# Patient Record
Sex: FEMALE | Race: WHITE | NOT HISPANIC OR LATINO | Employment: OTHER | URBAN - METROPOLITAN AREA
[De-identification: names, ages, dates, MRNs, and addresses within clinical notes are randomized per-mention and may not be internally consistent; named-entity substitution may affect disease eponyms.]

---

## 2017-01-19 ENCOUNTER — HOSPITAL ENCOUNTER (OUTPATIENT)
Facility: MEDICAL CENTER | Age: 67
End: 2017-01-19
Attending: PLASTIC SURGERY | Admitting: PLASTIC SURGERY
Payer: COMMERCIAL

## 2017-01-19 VITALS
OXYGEN SATURATION: 94 % | HEIGHT: 66 IN | RESPIRATION RATE: 16 BRPM | WEIGHT: 149.91 LBS | TEMPERATURE: 97.6 F | SYSTOLIC BLOOD PRESSURE: 124 MMHG | HEART RATE: 73 BPM | BODY MASS INDEX: 24.09 KG/M2 | DIASTOLIC BLOOD PRESSURE: 62 MMHG

## 2017-01-19 PROBLEM — Z85.3 PERSONAL HISTORY OF MALIGNANT NEOPLASM OF BREAST: Status: ACTIVE | Noted: 2017-01-19

## 2017-01-19 PROBLEM — D05.12 INTRADUCTAL CARCINOMA IN SITU OF LEFT BREAST: Status: RESOLVED | Noted: 2017-01-19 | Resolved: 2017-01-19

## 2017-01-19 PROBLEM — N65.0 DEFORMITY OF RECONSTRUCTED BREAST: Status: ACTIVE | Noted: 2017-01-19

## 2017-01-19 PROBLEM — D05.12 INTRADUCTAL CARCINOMA IN SITU OF LEFT BREAST: Status: ACTIVE | Noted: 2017-01-19

## 2017-01-19 LAB
BASOPHILS # BLD AUTO: 1 % (ref 0–1.8)
BASOPHILS # BLD: 0.05 K/UL (ref 0–0.12)
EOSINOPHIL # BLD AUTO: 0.13 K/UL (ref 0–0.51)
EOSINOPHIL NFR BLD: 2.5 % (ref 0–6.9)
ERYTHROCYTE [DISTWIDTH] IN BLOOD BY AUTOMATED COUNT: 46.9 FL (ref 35.9–50)
HCT VFR BLD AUTO: 46 % (ref 37–47)
HGB BLD-MCNC: 14.8 G/DL (ref 12–16)
IMM GRANULOCYTES # BLD AUTO: 0.02 K/UL (ref 0–0.11)
IMM GRANULOCYTES NFR BLD AUTO: 0.4 % (ref 0–0.9)
LYMPHOCYTES # BLD AUTO: 1.43 K/UL (ref 1–4.8)
LYMPHOCYTES NFR BLD: 27.6 % (ref 22–41)
MCH RBC QN AUTO: 29.7 PG (ref 27–33)
MCHC RBC AUTO-ENTMCNC: 32.2 G/DL (ref 33.6–35)
MCV RBC AUTO: 92.4 FL (ref 81.4–97.8)
MONOCYTES # BLD AUTO: 0.33 K/UL (ref 0–0.85)
MONOCYTES NFR BLD AUTO: 6.4 % (ref 0–13.4)
NEUTROPHILS # BLD AUTO: 3.22 K/UL (ref 2–7.15)
NEUTROPHILS NFR BLD: 62.1 % (ref 44–72)
NRBC # BLD AUTO: 0 K/UL
NRBC BLD AUTO-RTO: 0 /100 WBC
PLATELET # BLD AUTO: 157 K/UL (ref 164–446)
PMV BLD AUTO: 10.2 FL (ref 9–12.9)
RBC # BLD AUTO: 4.98 M/UL (ref 4.2–5.4)
WBC # BLD AUTO: 5.2 K/UL (ref 4.8–10.8)

## 2017-01-19 PROCEDURE — 700102 HCHG RX REV CODE 250 W/ 637 OVERRIDE(OP)

## 2017-01-19 PROCEDURE — 160036 HCHG PACU - EA ADDL 30 MINS PHASE I: Performed by: PLASTIC SURGERY

## 2017-01-19 PROCEDURE — 500888 HCHG PACK, MINOR BASIN: Performed by: PLASTIC SURGERY

## 2017-01-19 PROCEDURE — A6402 STERILE GAUZE <= 16 SQ IN: HCPCS | Performed by: PLASTIC SURGERY

## 2017-01-19 PROCEDURE — 110382 HCHG SHELL REV 271: Performed by: PLASTIC SURGERY

## 2017-01-19 PROCEDURE — 160002 HCHG RECOVERY MINUTES (STAT): Performed by: PLASTIC SURGERY

## 2017-01-19 PROCEDURE — A4606 OXYGEN PROBE USED W OXIMETER: HCPCS | Performed by: PLASTIC SURGERY

## 2017-01-19 PROCEDURE — L8600 IMPLANT BREAST SILICONE/EQ: HCPCS | Performed by: PLASTIC SURGERY

## 2017-01-19 PROCEDURE — 500043 HCHG BAG-A-JET: Performed by: PLASTIC SURGERY

## 2017-01-19 PROCEDURE — 160009 HCHG ANES TIME/MIN: Performed by: PLASTIC SURGERY

## 2017-01-19 PROCEDURE — A9270 NON-COVERED ITEM OR SERVICE: HCPCS

## 2017-01-19 PROCEDURE — 160029 HCHG SURGERY MINUTES - 1ST 30 MINS LEVEL 4: Performed by: PLASTIC SURGERY

## 2017-01-19 PROCEDURE — 160035 HCHG PACU - 1ST 60 MINS PHASE I: Performed by: PLASTIC SURGERY

## 2017-01-19 PROCEDURE — 160041 HCHG SURGERY MINUTES - EA ADDL 1 MIN LEVEL 4: Performed by: PLASTIC SURGERY

## 2017-01-19 PROCEDURE — 502240 HCHG MISC OR SUPPLY RC 0272: Performed by: PLASTIC SURGERY

## 2017-01-19 PROCEDURE — 500772 HCHG KIT, MAMMARY FILL FLUID TRANSFER: Performed by: PLASTIC SURGERY

## 2017-01-19 PROCEDURE — 110371 HCHG SHELL REV 272: Performed by: PLASTIC SURGERY

## 2017-01-19 PROCEDURE — 500438 HCHG DRESSING, SPANDAGE #10 12: Performed by: PLASTIC SURGERY

## 2017-01-19 PROCEDURE — 700101 HCHG RX REV CODE 250: Mod: JW

## 2017-01-19 PROCEDURE — 500122 HCHG BOVIE, BLADE: Performed by: PLASTIC SURGERY

## 2017-01-19 PROCEDURE — 160048 HCHG OR STATISTICAL LEVEL 1-5: Performed by: PLASTIC SURGERY

## 2017-01-19 PROCEDURE — 700111 HCHG RX REV CODE 636 W/ 250 OVERRIDE (IP)

## 2017-01-19 PROCEDURE — 500423 HCHG DRESSING, ABD COMBINE: Performed by: PLASTIC SURGERY

## 2017-01-19 PROCEDURE — 501838 HCHG SUTURE GENERAL: Performed by: PLASTIC SURGERY

## 2017-01-19 PROCEDURE — 700101 HCHG RX REV CODE 250

## 2017-01-19 PROCEDURE — 501445 HCHG STAPLER, SKIN DISP: Performed by: PLASTIC SURGERY

## 2017-01-19 PROCEDURE — 85025 COMPLETE CBC W/AUTO DIFF WBC: CPT

## 2017-01-19 DEVICE — IMPLANTABLE DEVICE: Type: IMPLANTABLE DEVICE | Site: BREAST | Status: FUNCTIONAL

## 2017-01-19 RX ORDER — CEFAZOLIN SODIUM 1 G/3ML
INJECTION, POWDER, FOR SOLUTION INTRAMUSCULAR; INTRAVENOUS
Status: DISCONTINUED
Start: 2017-01-19 | End: 2017-01-19 | Stop reason: HOSPADM

## 2017-01-19 RX ORDER — ONDANSETRON 4 MG/1
4 TABLET, FILM COATED ORAL EVERY 4 HOURS PRN
Qty: 10 TAB | Refills: 1 | Status: SHIPPED | OUTPATIENT
Start: 2017-01-19 | End: 2017-04-25

## 2017-01-19 RX ORDER — OXYCODONE HYDROCHLORIDE AND ACETAMINOPHEN 5; 325 MG/1; MG/1
1-2 TABLET ORAL EVERY 4 HOURS PRN
Qty: 30 TAB | Refills: 0 | Status: SHIPPED | OUTPATIENT
Start: 2017-01-19 | End: 2017-04-25

## 2017-01-19 RX ORDER — LIDOCAINE HYDROCHLORIDE 10 MG/ML
INJECTION, SOLUTION INFILTRATION; PERINEURAL
Status: DISCONTINUED
Start: 2017-01-19 | End: 2017-01-19 | Stop reason: HOSPADM

## 2017-01-19 RX ORDER — HYDROCODONE BITARTRATE AND ACETAMINOPHEN 10; 325 MG/1; MG/1
2 TABLET ORAL EVERY 4 HOURS PRN
Status: DISCONTINUED | OUTPATIENT
Start: 2017-01-19 | End: 2017-01-19 | Stop reason: HOSPADM

## 2017-01-19 RX ORDER — SERTRALINE HYDROCHLORIDE 100 MG/1
100 TABLET, FILM COATED ORAL DAILY
COMMUNITY

## 2017-01-19 RX ORDER — LIDOCAINE HYDROCHLORIDE 10 MG/ML
INJECTION, SOLUTION EPIDURAL; INFILTRATION; INTRACAUDAL; PERINEURAL
Status: DISCONTINUED
Start: 2017-01-19 | End: 2017-01-19 | Stop reason: HOSPADM

## 2017-01-19 RX ORDER — HYDROCODONE BITARTRATE AND ACETAMINOPHEN 10; 325 MG/1; MG/1
1 TABLET ORAL EVERY 4 HOURS PRN
Status: DISCONTINUED | OUTPATIENT
Start: 2017-01-19 | End: 2017-01-19 | Stop reason: HOSPADM

## 2017-01-19 RX ORDER — BACITRACIN 50000 [IU]/1
INJECTION, POWDER, FOR SOLUTION INTRAMUSCULAR
Status: DISCONTINUED
Start: 2017-01-19 | End: 2017-01-19 | Stop reason: HOSPADM

## 2017-01-19 RX ORDER — SODIUM CHLORIDE, SODIUM LACTATE, POTASSIUM CHLORIDE, CALCIUM CHLORIDE 600; 310; 30; 20 MG/100ML; MG/100ML; MG/100ML; MG/100ML
INJECTION, SOLUTION INTRAVENOUS
Status: ACTIVE | OUTPATIENT
Start: 2017-01-19 | End: 2017-01-19

## 2017-01-19 RX ORDER — MORPHINE SULFATE 4 MG/ML
2.5 INJECTION, SOLUTION INTRAMUSCULAR; INTRAVENOUS
Status: DISCONTINUED | OUTPATIENT
Start: 2017-01-19 | End: 2017-01-19 | Stop reason: HOSPADM

## 2017-01-19 RX ORDER — ACETAMINOPHEN 650 MG/1
650 SUPPOSITORY RECTAL EVERY 4 HOURS PRN
Status: DISCONTINUED | OUTPATIENT
Start: 2017-01-19 | End: 2017-01-19 | Stop reason: HOSPADM

## 2017-01-19 RX ORDER — ACETAMINOPHEN 325 MG/1
325 TABLET ORAL EVERY 4 HOURS PRN
Status: DISCONTINUED | OUTPATIENT
Start: 2017-01-19 | End: 2017-01-19 | Stop reason: HOSPADM

## 2017-01-19 RX ORDER — SODIUM CHLORIDE, SODIUM LACTATE, POTASSIUM CHLORIDE, CALCIUM CHLORIDE 600; 310; 30; 20 MG/100ML; MG/100ML; MG/100ML; MG/100ML
1000 INJECTION, SOLUTION INTRAVENOUS
Status: DISCONTINUED | OUTPATIENT
Start: 2017-01-19 | End: 2017-01-19 | Stop reason: HOSPADM

## 2017-01-19 RX ORDER — ONDANSETRON 2 MG/ML
4 INJECTION INTRAMUSCULAR; INTRAVENOUS EVERY 4 HOURS PRN
Status: DISCONTINUED | OUTPATIENT
Start: 2017-01-19 | End: 2017-01-19 | Stop reason: HOSPADM

## 2017-01-19 RX ORDER — HALOPERIDOL 5 MG/ML
INJECTION INTRAMUSCULAR
Status: COMPLETED
Start: 2017-01-19 | End: 2017-01-19

## 2017-01-19 RX ORDER — GENTAMICIN SULFATE 40 MG/ML
INJECTION, SOLUTION INTRAMUSCULAR; INTRAVENOUS
Status: DISCONTINUED
Start: 2017-01-19 | End: 2017-01-19 | Stop reason: HOSPADM

## 2017-01-19 RX ORDER — CEPHALEXIN 500 MG/1
500 CAPSULE ORAL 4 TIMES DAILY
Qty: 28 CAP | Refills: 0 | Status: SHIPPED | OUTPATIENT
Start: 2017-01-19 | End: 2017-04-25

## 2017-01-19 RX ORDER — OXYCODONE HCL 5 MG/5 ML
SOLUTION, ORAL ORAL
Status: COMPLETED
Start: 2017-01-19 | End: 2017-01-19

## 2017-01-19 RX ADMIN — FENTANYL CITRATE 50 MCG: 50 INJECTION, SOLUTION INTRAMUSCULAR; INTRAVENOUS at 15:00

## 2017-01-19 RX ADMIN — HALOPERIDOL LACTATE 1 MG: 5 INJECTION, SOLUTION INTRAMUSCULAR at 16:15

## 2017-01-19 RX ADMIN — OXYCODONE HYDROCHLORIDE 10 MG: 5 SOLUTION ORAL at 14:33

## 2017-01-19 RX ADMIN — FENTANYL CITRATE 25 MCG: 50 INJECTION, SOLUTION INTRAMUSCULAR; INTRAVENOUS at 14:52

## 2017-01-19 ASSESSMENT — PAIN SCALES - GENERAL
PAINLEVEL_OUTOF10: 6
PAINLEVEL_OUTOF10: 3
PAINLEVEL_OUTOF10: 5
PAINLEVEL_OUTOF10: 0
PAINLEVEL_OUTOF10: 3
PAINLEVEL_OUTOF10: 4
PAINLEVEL_OUTOF10: 0
PAINLEVEL_OUTOF10: 3

## 2017-01-19 NOTE — DISCHARGE INSTRUCTIONS
ACTIVITY: Rest and take it easy for the first 24 hours.  A responsible adult is recommended to remain with you during that time.  It is normal to feel sleepy.  We encourage you to not do anything that requires balance, judgment or coordination.    MILD FLU-LIKE SYMPTOMS ARE NORMAL. YOU MAY EXPERIENCE GENERALIZED MUSCLE ACHES, THROAT IRRITATION, HEADACHE AND/OR SOME NAUSEA.    FOR 24 HOURS DO NOT:  Drive, operate machinery or run household appliances.  Drink beer or alcoholic beverages.   Make important decisions or sign legal documents.    SPECIAL INSTRUCTIONS: *drink plenty of fluids, call the doctor for any concerns.**    DIET: To avoid nausea, slowly advance diet as tolerated, avoiding spicy or greasy foods for the first day.  Add more substantial food to your diet according to your physician's instructions.  Babies can be fed formula or breast milk as soon as they are hungry.  INCREASE FLUIDS AND FIBER TO AVOID CONSTIPATION.    SURGICAL DRESSING/BATHING: *keep dressing clean and dry for 72 hours (sunday), then you may remove and shower.**    FOLLOW-UP APPOINTMENT:  A follow-up appointment should be arranged with your doctor in *1 week**; call to schedule.    You should CALL YOUR PHYSICIAN if you develop:  Fever greater than 101 degrees F.  Pain not relieved by medication, or persistent nausea or vomiting.  Excessive bleeding (blood soaking through dressing) or unexpected drainage from the wound.  Extreme redness or swelling around the incision site, drainage of pus or foul smelling drainage.  Inability to urinate or empty your bladder within 8 hours.  Problems with breathing or chest pain.    You should call 911 if you develop problems with breathing or chest pain.  If you are unable to contact your doctor or surgical center, you should go to the nearest emergency room or urgent care center.  Physician's telephone #: *DR MUNIZ [phone]**    If any questions arise, call your doctor.  If your doctor is not  available, please feel free to call the Surgical Center at [phone].  The Center is open Monday through Friday from 7AM to 7PM.  You can also call the HEALTH HOTLINE open 24 hours/day, 7 days/week and speak to a nurse at [phone], or toll free at [phone].    A registered nurse may call you a few days after your surgery to see how you are doing after your procedure.    MEDICATIONS: Resume taking daily medication.  Take prescribed pain medication with food.  If no medication is prescribed, you may take non-aspirin pain medication if needed.  PAIN MEDICATION CAN BE VERY CONSTIPATING.  Take a stool softener or laxative such as senokot, pericolace, or milk of magnesia if needed.    Prescription given for *percocet, zofran, and keflex**.  Last pain medication given at *2:30pm**.    If your physician has prescribed pain medication that includes Acetaminophen (Tylenol), do not take additional Acetaminophen (Tylenol) while taking the prescribed medication.    Depression / Suicide Risk    As you are discharged from this [facility] facility, it is important to learn how to keep safe from harming yourself.    Recognize the warning signs:  · Abrupt changes in personality, positive or negative- including increase in energy   · Giving away possessions  · Change in eating patterns- significant weight changes-  positive or negative  · Change in sleeping patterns- unable to sleep or sleeping all the time   · Unwillingness or inability to communicate  · Depression  · Unusual sadness, discouragement and loneliness  · Talk of wanting to die  · Neglect of personal appearance   · Rebelliousness- reckless behavior  · Withdrawal from people/activities they love  · Confusion- inability to concentrate     If you or a loved one observes any of these behaviors or has concerns about self-harm, here's what you can do:  · Talk about it- your feelings and reasons for harming yourself  · Remove any means that you might use to hurt  yourself (examples: pills, rope, extension cords, firearm)  · Get professional help from the community (Mental Health, Substance Abuse, psychological counseling)  · Do not be alone:Call your Safe Contact- someone whom you trust who will be there for you.  · Call your local CRISIS HOTLINE 031-6320 or 788-189-9483  · Call your local Children's Mobile Crisis Response Team [place] (064) 315-7501 or www.T-ZONE  · Call the toll free National Suicide Prevention Hotlines   · National Suicide Prevention Lifeline 192-654-QVCM (0770)  · National Hope Line Network 800-SUICIDE (431-8300)

## 2017-01-19 NOTE — OR SURGEON
Immediate Post-Operative Note      PreOp Diagnosis: history of left breast cancer    PostOp Diagnosis: same    Procedure(s) (LRB):  TISSUE EXPANDER PLACE/REMOVE (Bilateral)  BREAST IMPLANT GEL, PARTIAL CAPSULECTOMIES (Bilateral)  BREAST RECONSTRUCTION USING LOCAL TISSUE AND FAT GRAFTING (Bilateral)    Surgeon(s):  Darshan Orr M.D.    Anesthesiologist/Type of Anesthesia:  Anesthesiologist: Elizabeth Wood M.D./General    Surgical Staff:  Circulator: Angle Levine R.N.; Nuris Mandujano R.N.  Relief Scrub: Piedad Oviedo  Scrub Person: Oscar Rios    Specimen: none    Estimated Blood Loss: minimal    Findings: see operative note    Complications: no apparent    #[id number]    1/19/2017 2:24 PM Darshan Orr

## 2017-01-19 NOTE — IP AVS SNAPSHOT
" After Visit Summary                                                                                                                Name:Anders Chicas  Medical Record Number:[id number]  CSN: [id number]    YOB: 1950   Age: 66 y.o.  Sex: female  HT:1.676 m (5' 6\") WT: 68 kg (149 lb 14.6 oz)          Admit Date: 1/19/2017     Discharge Date:   Today's Date: 1/19/2017  Attending Doctor:  Darshan Orr M.D.                  Allergies:  Donnatal              Follow-up Information     1. Follow up with Darshan Orr M.D. On 1/23/2017.    Specialty:  Plastic Surgery    Contact information    [address]  [facility] [id number] [phone]          Discharge Instructions         ACTIVITY: Rest and take it easy for the first 24 hours.  A responsible adult is recommended to remain with you during that time.  It is normal to feel sleepy.  We encourage you to not do anything that requires balance, judgment or coordination.    MILD FLU-LIKE SYMPTOMS ARE NORMAL. YOU MAY EXPERIENCE GENERALIZED MUSCLE ACHES, THROAT IRRITATION, HEADACHE AND/OR SOME NAUSEA.    FOR 24 HOURS DO NOT:  Drive, operate machinery or run household appliances.  Drink beer or alcoholic beverages.   Make important decisions or sign legal documents.    SPECIAL INSTRUCTIONS: *drink plenty of fluids, call the doctor for any concerns.**    DIET: To avoid nausea, slowly advance diet as tolerated, avoiding spicy or greasy foods for the first day.  Add more substantial food to your diet according to your physician's instructions.  Babies can be fed formula or breast milk as soon as they are hungry.  INCREASE FLUIDS AND FIBER TO AVOID CONSTIPATION.    SURGICAL DRESSING/BATHING: *keep dressing clean and dry for 72 hours (sunday), then you may remove and shower.**    FOLLOW-UP APPOINTMENT:  A follow-up appointment should be arranged with your doctor in *1 week**; call to schedule.    You should CALL YOUR PHYSICIAN if you develop:  Fever greater than " 101 degrees F.  Pain not relieved by medication, or persistent nausea or vomiting.  Excessive bleeding (blood soaking through dressing) or unexpected drainage from the wound.  Extreme redness or swelling around the incision site, drainage of pus or foul smelling drainage.  Inability to urinate or empty your bladder within 8 hours.  Problems with breathing or chest pain.    You should call 911 if you develop problems with breathing or chest pain.  If you are unable to contact your doctor or surgical center, you should go to the nearest emergency room or urgent care center.  Physician's telephone #: *DR MUNIZ [phone]**    If any questions arise, call your doctor.  If your doctor is not available, please feel free to call the Surgical Center at [phone].  The Center is open Monday through Friday from 7AM to 7PM.  You can also call the [employer] HOTLINE open 24 hours/day, 7 days/week and speak to a nurse at [phone], or toll free at [phone].    A registered nurse may call you a few days after your surgery to see how you are doing after your procedure.    MEDICATIONS: Resume taking daily medication.  Take prescribed pain medication with food.  If no medication is prescribed, you may take non-aspirin pain medication if needed.  PAIN MEDICATION CAN BE VERY CONSTIPATING.  Take a stool softener or laxative such as senokot, pericolace, or milk of magnesia if needed.    Prescription given for *percocet, zofran, and keflex**.  Last pain medication given at *2:30pm**.    If your physician has prescribed pain medication that includes Acetaminophen (Tylenol), do not take additional Acetaminophen (Tylenol) while taking the prescribed medication.    Depression / Suicide Risk    As you are discharged from this [facility] facility, it is important to learn how to keep safe from harming yourself.    Recognize the warning signs:  · Abrupt changes in personality, positive or negative- including increase in energy    · Giving away possessions  · Change in eating patterns- significant weight changes-  positive or negative  · Change in sleeping patterns- unable to sleep or sleeping all the time   · Unwillingness or inability to communicate  · Depression  · Unusual sadness, discouragement and loneliness  · Talk of wanting to die  · Neglect of personal appearance   · Rebelliousness- reckless behavior  · Withdrawal from people/activities they love  · Confusion- inability to concentrate     If you or a loved one observes any of these behaviors or has concerns about self-harm, here's what you can do:  · Talk about it- your feelings and reasons for harming yourself  · Remove any means that you might use to hurt yourself (examples: pills, rope, extension cords, firearm)  · Get professional help from the community (Mental Health, Substance Abuse, psychological counseling)  · Do not be alone:Call your Safe Contact- someone whom you trust who will be there for you.  · Call your local CRISIS HOTLINE 528-0174 or 995-945-6349  · Call your local Children's Mobile Crisis Response Team [place] (362) 385-6681 or wwwPitadela  · Call the toll free National Suicide Prevention Hotlines   · National Suicide Prevention Lifeline 970-678-IHEU (0624)  · National Hope Line Network 800-SUICIDE (422-0900)       Medication List      START taking these medications        Instructions    cephALEXin 500 MG Caps   Commonly known as:  KEFLEX    Take 1 Cap by mouth 4 times a day.   Dose:  500 mg       ondansetron 4 MG Tabs tablet   Commonly known as:  ZOFRAN    Take 1 Tab by mouth every four hours as needed.   Dose:  4 mg       oxycodone-acetaminophen 5-325 MG Tabs   Commonly known as:  PERCOCET    Take 1-2 Tabs by mouth every four hours as needed.   Dose:  1-2 Tab         CONTINUE taking these medications        Instructions    ALEVE PO    Take  by mouth. Unknown dose       ALIGN PO    Take  by mouth every day. Unsure of dose.       ARIMIDEX PO     Take 1 mg by mouth every day.   Dose:  1 mg       calcium carbonate 500 MG Chew   Commonly known as:  TUMS    Take 1,000 mg by mouth every day.   Dose:  1000 mg       LAMICTAL  MG Tb24   Generic drug:  LamoTRIgine    Take  by mouth every bedtime.       NUEDEXTA 20-10 MG Caps   Generic drug:  Dextromethorphan-Quinidine    Take  by mouth 2 Times a Day.       NUVIGIL PO    Take 75 mg by mouth every day.   Dose:  75 mg       PREVACID 30 MG Cpdr   Generic drug:  lansoprazole    Take 30 mg by mouth every day.   Dose:  30 mg       PROLIA 60 MG/ML Soln   Generic drug:  denosumab    Inject 60 mg as instructed Once. Every six months next dose due in March   Dose:  60 mg       SEROQUEL XR 50 MG Tb24   Generic drug:  QUEtiapine Fumarate    Take 1 Tab by mouth every bedtime.   Dose:  1 Tab       sertraline 100 MG Tabs   Commonly known as:  ZOLOFT    Take 100 mg by mouth every day.   Dose:  100 mg               Medication Information     Above and/or attached are the medications your physician expects you to take upon discharge. Review all of your home medications and newly ordered medications with your doctor and/or pharmacist. Follow medication instructions as directed by your doctor and/or pharmacist. Please keep your medication list with you and share with your physician. Update the information when medications are discontinued, doses are changed, or new medications (including over-the-counter products) are added; and carry medication information at all times in the event of emergency situations.        Resources     Quit Smoking / Tobacco Use:    I understand the use of any tobacco products increases my chance of suffering from future heart disease or stroke and could cause other illnesses which may shorten my life. Quitting the use of tobacco products is the single most important thing I can do to improve my health. For further information on smoking / tobacco cessation call a Toll Free Quit Line at 1-208.900.5180  (*National [employer] [place]) or 1-323.977.2620 (American Lung Association) or you can access the web based program at www.lungusa.org.    [place] Tobacco Users Help Line:  (820) 760-5507       Toll Free: 9-502-776-4193  Quit Tobacco Program [facility] (937)603-1590    Crisis Hotline:    [place] Crisis Hotline:  4-146-XBVRIIG or 1-874.215.5201    [place] Crisis Hotline:    1-355.596.3400 or 229-227-4121    Discharge Survey:   Thank you for choosing [facility]. We hope we did everything we could to make your hospital stay a pleasant one. You may be receiving a survey and we would appreciate your time and participation in answering the questions. Your input is very valuable to us in our efforts to improve our service to our patients and their families.            Signatures     My signature on this form indicates that:    1. I acknowledge receipt and understanding of these Home Care Instruction.  2. My questions regarding this information have been answered to my satisfaction.  3. I have formulated a plan with my discharge nurse to obtain my prescribed medications for home.    __________________________________      __________________________________                   Patient Signature                                 Guardian/Responsible Adult Signature      __________________________________                 __________       ________                       Nurse Signature                                               Date                 Time

## 2017-01-19 NOTE — IP AVS SNAPSHOT
1/19/2017          Anders Jean Baptistesergio  [address] [place] [address]    Dear Anders:    [facility] wants to ensure your discharge home is safe and you or your loved ones have had all your questions answered regarding your care after you leave the hospital.    You may receive a telephone call within two days of your discharge.  This call is to make certain you understand your discharge instructions as well as ensure we provided you with the best care possible during your stay with us.     The call will only last approximately 3-5 minutes and will be done by a nurse.    Once again, we want to ensure your discharge home is safe and that you have a clear understanding of any next steps in your care.  If you have any questions or concerns, please do not hesitate to contact us, we are here for you.  Thank you for choosing [facility] for your healthcare needs.    Sincerely,    Jai Hardin    [facility]

## 2017-01-19 NOTE — OR NURSING
"1426 Received pt from the OR with Dr Wood.  VSS, in no signs of distress. Pt aware of POC. Dressing with gauze and stockinette, CDI, no signs of drainage.    1433 Pt complains of mild pain, medicated with oral pain meds.    1452 Pt complains of pain, medicated.    1500 Pt complains of pain, medicated. No drainage to note from dressings.    1530 Pt sleeping,  updated on POC< pt resting, VSS, doesn't want visitors yet.    1600 Pt resting, VSS, in no signs of distress.  at side, updated on POC.    1620 Pt given IS to help with deep breathing techniques. Pt complains of slight nausea, medicated.    1700 Pt struggling to maintain sats above 90%. Oxygen reapplied. Pt states, \"I feel so drowsy\"    1720 Discharge instructions given to pt and family, both verbalize understanding.      1740 Able to dress and steady on feet. Able to ambulate to the BR and void without difficulty. Dressings to chest remain CDI.   Pt meets discharge criteria.     1806  Pt discharged, taken to car in  by RN.  All questions/concerns addressed.  "

## 2017-01-20 NOTE — OP REPORT
DATE OF SERVICE:  01/19/2017    PREOPERATIVE DIAGNOSES:  1.  History of left breast cancer.  2.  Asymmetry between reconstructed breasts.    POSTOPERATIVE DIAGNOSES:  1.  History of left breast cancer.  2.  Asymmetry between reconstructed breasts.    PROCEDURES:  1.  Bilateral tissue expander removal and gel implant placement.  2.  Bilateral medial capsulectomies with lateral capsulorrhaphies.  3.  Bilateral breast reconstruction using dermal adipofascial flaps and fat   grafting.    ATTENDING SURGEON:  Darshan Orr MD    ANESTHESIOLOGIST:  Elizabeth Wood MD    ANESTHESIA TYPE:  General.    SPECIMENS:  None.    ESTIMATED BLOOD LOSS:  Minimal.    COMPLICATIONS:  No apparent.    INDICATIONS FOR PROCEDURE:  The patient is a 66-year-old woman who had   previous left breast cancer and underwent bilateral mastectomies and   reconstruction with tissue expanders.  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  The patient did go on to have successful tissue   expansion and now presents for the next phase of her reconstruction.    INTRAOPERATIVE FINDINGS:  [employer] MemoryGel breast implant 700 mL smooth round   high profile implants were placed, reference number [id number]; same size   implant on both sides.  There was about 90 mL of fat grafted in each of the   reconstructed breasts.    DESCRIPTION OF PROCEDURE:  After the operative and nonoperative options were   discussed and include was not limited to bleeding, infection, damage to   surrounding structures, need for further surgery, reaction to anesthetic   agent, scarring, breast asymmetry, contour irregularities, implant failure,   implant rupture, capsular contracture development and need for revisional   surgery, deep venous thrombosis, pulmonary embolus, myocardial infarction,   stroke and/or death, informed consent was obtained.  Preoperatively, she was   identified in the  sitting upright position, the patient's sternal notch,   midline inframammary folds were marked.  The planned dermal adipofascial flaps   were drawn out on the breasts bilaterally.  Areas of fat graft harvest were   marked on the abdomen.  Areas of pocket modifications were marked out on the   breast, both medially and superiorly.  Antibiotics were given.  Sequential   compression devices were placed.  Patient brought to operating room and   general anesthesia was induced.  Her chest and abdomen was prepped and draped   in usual sterile fashion.  Starting on the right hand side, a 15-blade was   used to excise down through the old mastectomy scar.  Cautery was used to   dissect down through the tissue down to the underlying capsule, which was   opened up.  The patient did have very thin, soft tissue coverage in this area.    At this point, a tissue expander was deflated and removed.  The pocket was   inspected.  The patient did have very thin, soft tissue coverage over the   anterior aspect.  Bovie electrocautery was then used to go down and remove the   capsule on the underside of the pectoralis major muscle.  The pectoralis   major muscle was then elevated medially to elevate and medialize the pocket.    Then, out laterally running interrupted 2-0 Vicryl suture was used to plicate   the capsule to further medialize the pocket.  Once this was then done, the   pocket was then irrigated.  Sizers were put into position and the patient was   inspected.  Ultimately, I felt that a 700 mL implant would be most   appropriate.  The sizer was removed and then using a minimal touch technique   and new gloves, the implant was placed.    The patient did have very thin, soft tissue coverage anteriorly.  In order to   give better coverage, dermal adipofascial flaps were created both superiorly   and inferiorly around the old mastectomy scars.  Metzenbaum scissors was then   used to de-epithelialize the tissue superiorly and  inferiorly.  Then, Bovie   electrocautery was used to elevate the tissue off the old capsule.  This   tissue was then advanced upright and then used to reinforce the closure in a   pants-over-vest fashion using 2-0 Vicryl sutures.  Once this was done, the   mastectomy skin flaps were then freed and elevated upward and then these were   then closed with 3-0 deep dermal Monocryl sutures and a running 4-0 Monocryl   subcuticular stitch.    We turned our attention to the contralateral side where the same procedure was   performed.  The same size implant was placed.    Poke incisions were made in the bilateral lower quadrants.  Tumescent solution   was then placed in the supra and infraumbilical abdomen.  Adequate time was   allowed for the hemostatic effects of epinephrine to take effect.  Fat was   then harvested from the supra and infraumbilical abdomen.  The fat was then   irrigated.  The supernatant and infranatant was removed.  The fat was then   loaded into 20 mL syringes on the back table.  Poke incisions were then made   superiorly and medially on the breast.  Fat was then grafted using a fanning   technique.  Care was taken to ensure that equal amounts of fat were then   placed with each passage of the cannula.  The poke incisions were closed with   interrupted 5-0 fast absorbing sutures.  The patient was washed.  Steri-Strips   and compressive dressing was then placed.  The patient was then awakened,   extubated and transferred to the PACU in stable condition.  At the end of   procedure, all sponge, instrument and needle counts were correct.       ____________________________________     MD LEE PAN / KAVITA    DD:  01/19/2017 14:30:24  DT:  01/19/2017 16:36:10    D#:  [id number]  Job#:  [id number]

## 2017-04-20 ENCOUNTER — PATIENT OUTREACH (OUTPATIENT)
Dept: OTHER | Facility: MEDICAL CENTER | Age: 67
End: 2017-04-20

## 2017-04-20 NOTE — PROGRESS NOTES
"Phoned to schedule visit to deliver and review breast cancer treatment summary/ survivorship care plan.  Pt stated she has \"too much going on in my life\" to come in to meet face to face. Prefers it to be mailed, with telephone follow up.  "

## 2017-04-25 DIAGNOSIS — Z01.812 PRE-OPERATIVE LABORATORY EXAMINATION: ICD-10-CM

## 2017-04-25 LAB
ERYTHROCYTE [DISTWIDTH] IN BLOOD BY AUTOMATED COUNT: 45 FL (ref 35.9–50)
HCT VFR BLD AUTO: 46.1 % (ref 37–47)
HGB BLD-MCNC: 14.5 G/DL (ref 12–16)
MCH RBC QN AUTO: 29.3 PG (ref 27–33)
MCHC RBC AUTO-ENTMCNC: 31.5 G/DL (ref 33.6–35)
MCV RBC AUTO: 93.1 FL (ref 81.4–97.8)
PLATELET # BLD AUTO: 195 K/UL (ref 164–446)
PMV BLD AUTO: 10.2 FL (ref 9–12.9)
RBC # BLD AUTO: 4.95 M/UL (ref 4.2–5.4)
WBC # BLD AUTO: 7.1 K/UL (ref 4.8–10.8)

## 2017-04-25 PROCEDURE — 36415 COLL VENOUS BLD VENIPUNCTURE: CPT

## 2017-04-25 PROCEDURE — 85027 COMPLETE CBC AUTOMATED: CPT

## 2017-04-27 ENCOUNTER — HOSPITAL ENCOUNTER (OUTPATIENT)
Facility: MEDICAL CENTER | Age: 67
End: 2017-04-27
Attending: PLASTIC SURGERY | Admitting: PLASTIC SURGERY
Payer: COMMERCIAL

## 2017-04-27 VITALS
HEART RATE: 68 BPM | RESPIRATION RATE: 16 BRPM | BODY MASS INDEX: 24.34 KG/M2 | WEIGHT: 151.46 LBS | SYSTOLIC BLOOD PRESSURE: 116 MMHG | HEIGHT: 66 IN | DIASTOLIC BLOOD PRESSURE: 69 MMHG | TEMPERATURE: 97.2 F | OXYGEN SATURATION: 92 %

## 2017-04-27 PROCEDURE — 160036 HCHG PACU - EA ADDL 30 MINS PHASE I: Performed by: PLASTIC SURGERY

## 2017-04-27 PROCEDURE — 700111 HCHG RX REV CODE 636 W/ 250 OVERRIDE (IP)

## 2017-04-27 PROCEDURE — 160002 HCHG RECOVERY MINUTES (STAT): Performed by: PLASTIC SURGERY

## 2017-04-27 PROCEDURE — 501445 HCHG STAPLER, SKIN DISP: Performed by: PLASTIC SURGERY

## 2017-04-27 PROCEDURE — 500043 HCHG BAG-A-JET: Performed by: PLASTIC SURGERY

## 2017-04-27 PROCEDURE — A6403 STERILE GAUZE>16 <= 48 SQ IN: HCPCS | Performed by: PLASTIC SURGERY

## 2017-04-27 PROCEDURE — A4606 OXYGEN PROBE USED W OXIMETER: HCPCS | Performed by: PLASTIC SURGERY

## 2017-04-27 PROCEDURE — 160048 HCHG OR STATISTICAL LEVEL 1-5: Performed by: PLASTIC SURGERY

## 2017-04-27 PROCEDURE — 160029 HCHG SURGERY MINUTES - 1ST 30 MINS LEVEL 4: Performed by: PLASTIC SURGERY

## 2017-04-27 PROCEDURE — 160035 HCHG PACU - 1ST 60 MINS PHASE I: Performed by: PLASTIC SURGERY

## 2017-04-27 PROCEDURE — 502240 HCHG MISC OR SUPPLY RC 0272: Performed by: PLASTIC SURGERY

## 2017-04-27 PROCEDURE — 500888 HCHG PACK, MINOR BASIN: Performed by: PLASTIC SURGERY

## 2017-04-27 PROCEDURE — 500423 HCHG DRESSING, ABD COMBINE: Performed by: PLASTIC SURGERY

## 2017-04-27 PROCEDURE — A9270 NON-COVERED ITEM OR SERVICE: HCPCS

## 2017-04-27 PROCEDURE — 501838 HCHG SUTURE GENERAL: Performed by: PLASTIC SURGERY

## 2017-04-27 PROCEDURE — 700102 HCHG RX REV CODE 250 W/ 637 OVERRIDE(OP)

## 2017-04-27 PROCEDURE — 160041 HCHG SURGERY MINUTES - EA ADDL 1 MIN LEVEL 4: Performed by: PLASTIC SURGERY

## 2017-04-27 PROCEDURE — A6223 GAUZE >16<=48 NO W/SAL W/O B: HCPCS | Performed by: PLASTIC SURGERY

## 2017-04-27 PROCEDURE — 110371 HCHG SHELL REV 272: Performed by: PLASTIC SURGERY

## 2017-04-27 PROCEDURE — 700101 HCHG RX REV CODE 250

## 2017-04-27 PROCEDURE — 500122 HCHG BOVIE, BLADE: Performed by: PLASTIC SURGERY

## 2017-04-27 PROCEDURE — 110382 HCHG SHELL REV 271: Performed by: PLASTIC SURGERY

## 2017-04-27 PROCEDURE — 160009 HCHG ANES TIME/MIN: Performed by: PLASTIC SURGERY

## 2017-04-27 PROCEDURE — 500438 HCHG DRESSING, SPANDAGE #10 12: Performed by: PLASTIC SURGERY

## 2017-04-27 PROCEDURE — 501406 HCHG SPLINT ORTHO GLASS 3IN X15FT: Performed by: PLASTIC SURGERY

## 2017-04-27 RX ORDER — HYDROCODONE BITARTRATE AND ACETAMINOPHEN 10; 325 MG/1; MG/1
2 TABLET ORAL EVERY 4 HOURS PRN
Status: DISCONTINUED | OUTPATIENT
Start: 2017-04-27 | End: 2017-04-27 | Stop reason: HOSPADM

## 2017-04-27 RX ORDER — OXYCODONE HYDROCHLORIDE 5 MG/1
5 TABLET ORAL EVERY 4 HOURS PRN
Qty: 20 TAB | Refills: 0 | Status: SHIPPED | OUTPATIENT
Start: 2017-04-27

## 2017-04-27 RX ORDER — ACETAMINOPHEN 325 MG/1
325 TABLET ORAL EVERY 4 HOURS PRN
Status: DISCONTINUED | OUTPATIENT
Start: 2017-04-27 | End: 2017-04-27 | Stop reason: HOSPADM

## 2017-04-27 RX ORDER — HYDROCODONE BITARTRATE AND ACETAMINOPHEN 10; 325 MG/1; MG/1
1 TABLET ORAL EVERY 4 HOURS PRN
Status: DISCONTINUED | OUTPATIENT
Start: 2017-04-27 | End: 2017-04-27 | Stop reason: HOSPADM

## 2017-04-27 RX ORDER — HYDROCODONE BITARTRATE AND ACETAMINOPHEN 10; 325 MG/1; MG/1
TABLET ORAL
Status: COMPLETED
Start: 2017-04-27 | End: 2017-04-27

## 2017-04-27 RX ORDER — LIDOCAINE HYDROCHLORIDE 10 MG/ML
INJECTION, SOLUTION INFILTRATION; PERINEURAL
Status: DISCONTINUED
Start: 2017-04-27 | End: 2017-04-27 | Stop reason: HOSPADM

## 2017-04-27 RX ORDER — CEFAZOLIN SODIUM 1 G/3ML
INJECTION, POWDER, FOR SOLUTION INTRAMUSCULAR; INTRAVENOUS
Status: DISCONTINUED
Start: 2017-04-27 | End: 2017-04-27 | Stop reason: HOSPADM

## 2017-04-27 RX ORDER — LIDOCAINE HYDROCHLORIDE 10 MG/ML
INJECTION, SOLUTION EPIDURAL; INFILTRATION; INTRACAUDAL; PERINEURAL
Status: DISCONTINUED
Start: 2017-04-27 | End: 2017-04-27 | Stop reason: HOSPADM

## 2017-04-27 RX ORDER — ACETAMINOPHEN 650 MG/1
650 SUPPOSITORY RECTAL EVERY 4 HOURS PRN
Status: DISCONTINUED | OUTPATIENT
Start: 2017-04-27 | End: 2017-04-27 | Stop reason: HOSPADM

## 2017-04-27 RX ORDER — ONDANSETRON 4 MG/1
4 TABLET, FILM COATED ORAL EVERY 4 HOURS PRN
Qty: 5 TAB | Refills: 1 | Status: SHIPPED | OUTPATIENT
Start: 2017-04-27

## 2017-04-27 RX ORDER — ONDANSETRON 2 MG/ML
4 INJECTION INTRAMUSCULAR; INTRAVENOUS EVERY 4 HOURS PRN
Status: DISCONTINUED | OUTPATIENT
Start: 2017-04-27 | End: 2017-04-27 | Stop reason: HOSPADM

## 2017-04-27 RX ORDER — SODIUM CHLORIDE, SODIUM LACTATE, POTASSIUM CHLORIDE, CALCIUM CHLORIDE 600; 310; 30; 20 MG/100ML; MG/100ML; MG/100ML; MG/100ML
1000 INJECTION, SOLUTION INTRAVENOUS ONCE
Status: COMPLETED | OUTPATIENT
Start: 2017-04-27 | End: 2017-04-27

## 2017-04-27 RX ORDER — BACITRACIN 50000 [IU]/1
INJECTION, POWDER, FOR SOLUTION INTRAMUSCULAR
Status: DISCONTINUED
Start: 2017-04-27 | End: 2017-04-27 | Stop reason: HOSPADM

## 2017-04-27 RX ORDER — MORPHINE SULFATE 4 MG/ML
2.5 INJECTION, SOLUTION INTRAMUSCULAR; INTRAVENOUS
Status: DISCONTINUED | OUTPATIENT
Start: 2017-04-27 | End: 2017-04-27 | Stop reason: HOSPADM

## 2017-04-27 RX ORDER — HYDROCODONE BITARTRATE AND ACETAMINOPHEN 5; 325 MG/1; MG/1
TABLET ORAL
Status: COMPLETED
Start: 2017-04-27 | End: 2017-04-27

## 2017-04-27 RX ORDER — EPINEPHRINE 1 MG/ML(1)
AMPUL (ML) INJECTION
Status: DISCONTINUED
Start: 2017-04-27 | End: 2017-04-27 | Stop reason: HOSPADM

## 2017-04-27 RX ORDER — SODIUM CHLORIDE, SODIUM LACTATE, POTASSIUM CHLORIDE, CALCIUM CHLORIDE 600; 310; 30; 20 MG/100ML; MG/100ML; MG/100ML; MG/100ML
INJECTION, SOLUTION INTRAVENOUS
Status: DISCONTINUED | OUTPATIENT
Start: 2017-04-27 | End: 2017-04-27 | Stop reason: HOSPADM

## 2017-04-27 RX ORDER — MIDAZOLAM HYDROCHLORIDE 1 MG/ML
INJECTION INTRAMUSCULAR; INTRAVENOUS
Status: COMPLETED
Start: 2017-04-27 | End: 2017-04-27

## 2017-04-27 RX ADMIN — HYDROCODONE BITARTRATE AND ACETAMINOPHEN 30 ML: 2.5; 108 SOLUTION ORAL at 11:30

## 2017-04-27 RX ADMIN — HYDROMORPHONE HYDROCHLORIDE 0.2 MG: 1 INJECTION, SOLUTION INTRAMUSCULAR; INTRAVENOUS; SUBCUTANEOUS at 12:20

## 2017-04-27 RX ADMIN — SODIUM CHLORIDE, SODIUM LACTATE, POTASSIUM CHLORIDE, CALCIUM CHLORIDE 1000 ML: 600; 310; 30; 20 INJECTION, SOLUTION INTRAVENOUS at 07:35

## 2017-04-27 RX ADMIN — MIDAZOLAM 0.5 MG: 1 INJECTION INTRAMUSCULAR; INTRAVENOUS at 11:55

## 2017-04-27 RX ADMIN — HYDROCODONE BITARTRATE AND ACETAMINOPHEN 1 TABLET: 5; 325 TABLET ORAL at 15:25

## 2017-04-27 RX ADMIN — HYDROMORPHONE HYDROCHLORIDE 0.2 MG: 1 INJECTION, SOLUTION INTRAMUSCULAR; INTRAVENOUS; SUBCUTANEOUS at 12:45

## 2017-04-27 RX ADMIN — FENTANYL CITRATE 50 MCG: 50 INJECTION, SOLUTION INTRAMUSCULAR; INTRAVENOUS at 11:14

## 2017-04-27 RX ADMIN — HYDROCODONE BITARTRATE AND ACETAMINOPHEN: 10; 325 TABLET ORAL at 15:24

## 2017-04-27 RX ADMIN — FENTANYL CITRATE 50 MCG: 50 INJECTION, SOLUTION INTRAMUSCULAR; INTRAVENOUS at 10:55

## 2017-04-27 RX ADMIN — MIDAZOLAM 0.5 MG: 1 INJECTION INTRAMUSCULAR; INTRAVENOUS at 12:04

## 2017-04-27 ASSESSMENT — PAIN SCALES - GENERAL
PAINLEVEL_OUTOF10: 8
PAINLEVEL_OUTOF10: 9
PAINLEVEL_OUTOF10: 10
PAINLEVEL_OUTOF10: 4
PAINLEVEL_OUTOF10: 9
PAINLEVEL_OUTOF10: 7
PAINLEVEL_OUTOF10: 8
PAINLEVEL_OUTOF10: 8
PAINLEVEL_OUTOF10: 3
PAINLEVEL_OUTOF10: 4
PAINLEVEL_OUTOF10: 4
PAINLEVEL_OUTOF10: 0
PAINLEVEL_OUTOF10: 4
PAINLEVEL_OUTOF10: 10
PAINLEVEL_OUTOF10: 4
PAINLEVEL_OUTOF10: 7
PAINLEVEL_OUTOF10: 3
PAINLEVEL_OUTOF10: 3
PAINLEVEL_OUTOF10: 4
PAINLEVEL_OUTOF10: 10
PAINLEVEL_OUTOF10: 4
PAINLEVEL_OUTOF10: 5

## 2017-04-27 NOTE — OR NURSING
1417 Report received from Yaquelin Alvares.  Pt is resting chest dressing CDI.  Pain rating 4/10.  No c/o of n/v.  Pt  at bedside.  Placed PT on room air.      1524 Oral pain medication given, its been four hours since previous dose.      1530 Dc instructions completed with Pt. And her . Pt dressed and sitting in recliner chair.       1606 Dc to car via wheel chair.  Pt has all belongings.

## 2017-04-27 NOTE — OP REPORT
DATE OF SERVICE:  04/27/2017    PREOPERATIVE DIAGNOSES:  1.  History of right breast cancer.  2.  Asymmetry between reconstructed breasts.  3.  Symmastia.    POSTOPERATIVE DIAGNOSES:  1.  History of right breast cancer.  2.  Asymmetry between reconstructed breasts.  3.  Symmastia.    PROCEDURES PERFORMED:  1.  Bilateral nipple areolar reconstruction using a modified skate flap   technique and a full thickness skin graft.  2.  Bilateral dermal grafts to increase nipple projection.  3.  Bilateral breast fat grafting for reconstruction.  4.  Repair of symmastia with extensive midline capsulorrhaphy and resection of   redundant medial and inframammary fold tissue and closure with local tissue   rearrangement, 15 cm2.  5.  Revision of left lateral reconstructed breast with local tissue   rearrangement, 25 cm2.    ATTENDING SURGEON:  Darshan Orr MD    ANESTHESIOLOGIST:  Mani Spangler MD    ANESTHESIA TYPE:  General.    ASSISTANT:  CURTIS Guzman    ESTIMATED BLOOD LOSS:  Minimal.    COMPLICATIONS:  No apparent.    INDICATIONS FOR PROCEDURE:  The patient is a 67-year-old woman who is well   known to me who previously underwent bilateral mastectomies and reconstruction   and placement of tissue expanders and implants.  The patient has developed   symmastia.  She also wishes to have a nipple reconstruction.  In addition, she   does have asymmetry between her reconstructed breasts, and we are planning to   revise the left breast significantly to match more closely to the right   breast.  She now presents for the above operation.    INTRAOPERATIVE FINDINGS:  There was about 30 mL of fat grafted in each of the   reconstructed breast.    DESCRIPTION OF PROCEDURE:  After the operative and nonoperative options were   discussed and include but was not limited to bleeding, infection, damage to   surrounding structures, need for further surgery, reaction to anesthetic   agent, scarring, breast asymmetry,  contour irregularities, skin graft failure,   nipple reconstruction failure and need for revisional surgery, implant   failure, implant rupture, capsular contracture, development of deep venous   thrombosis, pulmonary embolus, myocardial infarction, stroke, unsatisfactory   result and/or death, informed consent was obtained.  Preoperatively, the   patient was identified.  In a sitting upright position, the patient's sternal   notch, midline and inframammary folds were marked.  The planned skin graft   harvest site was marked out laterally on the chest wall and the axillary roll.    The planned local flaps were marked out superior to this and then medial and   along the inframammary fold where the patient had an area of redundancy.  In   addition, the area of symmastia was marked.  I also marked out the location   for the reconstructed nipple on the most projecting portion of the breast   mound.  Areas for fat graft harvest were marked in the supraumbilical abdomen   and areas for fat graft placement were marked bilaterally.  Antibiotics given,   sequential compression devices placed, patient brought to operating room,   general anesthesia was induced.  Her chest and abdomen was prepped and draped   in usual sterile fashion.  Starting on the left hand side, the incision was   made down along the medial inframammary fold.  Cautery was then used to   dissect down through the tissue down to the underlying capsule, which was   opened up.  In doing this, I then removed the existing implant that was in   position.  This was then soaked in triple antibiotic irrigation.  The pocket   was then irrigated with triple antibiotic irrigation.  Following this, the   symmastia was inspected.  Numerous horizontal mattress 2-0 PDS suture were   then placed in 3 rows to reconstruct the area of symmastia.  Once this was   then done, the implant was then put back into position using a minimal touch   technique.  The capsules then closed  with 2-0 Vicryl sutures.  Following this,   the patient did have an area of redundancy medially that was bothersome.  The   local flap that was marked out preoperatively was incised with a 10 blade.    Bovie electrocautery was then used to elevate and mobilize the flap.  This was   then rotated in the superolateral direction.  The standing cutaneous   deformities were excised.  The deep tissue was closed with 2-0 Vicryl sutures,   the dermis with 3-0 deep dermal Monocryl sutures, and a running 4-0 Monocryl   subcuticular stitch was used to close the overlying skin.    We then turned our attention along the lateral aspect of the breast.  Two full   thickness skin grafts were harvested that were 38 mm in diameter.  The skin   grafts were harvested at the level of the reticular dermis and then defatted.    These were then set back on the back table.  Dermal grafts were then   harvested next to this by deepithelializing the tissue and then harvesting a   portion of the dermis.  This was then likewise set on the back table.    Following this, a large excision was then carried out around this tissue where   the patient had a large area of fullness.  The incision was performed with a   10 blade.  Bovie electrocautery was then used to elevate and mobilize the   tissue to create a superiorly based advancement flap.  Standing cutaneous   deformities were excised.  This tissue was then discarded.  The wound was then   closed in complex fashion with 2-0 Vicryl sutures in the deep tissue and 3-0   Monocryl sutures in the deep dermis and a running 4-0 Monocryl subcuticular   stitch to close the overlying skin.    We then turned our attention to the left nipple.  A 38 mm areolar sizer was   used to angela out this location.  A modified skate flap was drawn out.  The   tissue was then deepithelialized.  The limbs of the flap were then elevated   and rotated into the middle and then secured with interrupted 5-0 fast   absorbing  sutures.  A small dermal graft was then placed in the center of the   nipple to increase projection.  This was secured with interrupted 5-0   fast-absorbing suture.  The limbs of the skate flap were then rotated and   secured with the 5-0 fast absorbing sutures.  Following this, the full   thickness skin graft that was harvested was then incised in the middle and   then placed over the donor site and secured with interrupted 4-0 silk sutures.    The skin graft was then trimmed and running and interrupted 5-0 fast   absorbing sutures were done at the base of the nipple and around the nipple.    Then, Xeroform gauze and a bolster dressing were then performed.    I turned our attention to the contralateral side where the same procedure was   performed.    Poke incisions were made in the right lower quadrant.  Tumescent solution was   placed in the supraumbilical abdomen.  Adequate time was allowed for the   hemostatic effects of the epinephrine to take effect.  Fat was then harvested   from this location.  The fat was then irrigated and supernatant and   infranatant was removed.  The fat was then loaded into 30 mL syringes.  Poke   incisions were made medially and the breasts bilaterally.  Fat was then   grafted using a fanning technique.  Care was taken to ensure equal amounts of   fat were then placed in each passage of the cannula.  The poke incisions were   then closed with interrupted 5-0 fast-absorbing suture.  The patient was then   awakened, extubated, and transferred to the PACU in stable condition.  At the   end of procedure, all sponge, instrument and needle counts were correct.       ____________________________________     MD LEE PAN / KAVITA    DD:  04/27/2017 10:36:43  DT:  04/27/2017 11:14:49    D#:  [id number]  Job#:  [id number]

## 2017-04-27 NOTE — IP AVS SNAPSHOT
" Home Care Instructions                                                                                                                Name:Anders Chicas  Medical Record Number:[id number]  CSN: [id number]    YOB: 1950   Age: 67 y.o.  Sex: female  HT:1.664 m (5' 5.5\") WT: 68.7 kg (151 lb 7.3 oz)          Admit Date: 4/27/2017     Discharge Date:   Today's Date: 4/27/2017  Attending Doctor:  Darshan Orr M.D.                  Allergies:  Donnatal              Follow-up Information     1. Follow up with Darshan Orr M.D. On 5/3/2017.    Specialty:  Plastic Surgery    Contact information    [address]  [place] [address] [phone]          Discharge Instructions         ACTIVITY: Rest and take it easy for the first 24 hours.  A responsible adult is recommended to remain with you during that time.  It is normal to feel sleepy.  We encourage you to not do anything that requires balance, judgment or coordination.    MILD FLU-LIKE SYMPTOMS ARE NORMAL. YOU MAY EXPERIENCE GENERALIZED MUSCLE ACHES, THROAT IRRITATION, HEADACHE AND/OR SOME NAUSEA.    FOR 24 HOURS DO NOT:  Drive, operate machinery or run household appliances.  Drink beer or alcoholic beverages.   Make important decisions or sign legal documents.    SPECIAL INSTRUCTIONS: **sleep with head elevated  Continue medications as previously ordered  *    DIET: To avoid nausea, slowly advance diet as tolerated, avoiding spicy or greasy foods for the first day.  Add more substantial food to your diet according to your physician's instructions.  Babies can be fed formula or breast milk as soon as they are hungry.  INCREASE FLUIDS AND FIBER TO AVOID CONSTIPATION.    SURGICAL DRESSING/BATHING: *keep dsgs dry until f/u appt**    FOLLOW-UP APPOINTMENT:  A follow-up appointment should be arranged with your doctor in *call for f/u**; call to schedule.    You should CALL YOUR PHYSICIAN if you develop:  Fever greater than 101 degrees F.  Pain not " relieved by medication, or persistent nausea or vomiting.  Excessive bleeding (blood soaking through dressing) or unexpected drainage from the wound.  Extreme redness or swelling around the incision site, drainage of pus or foul smelling drainage.  Inability to urinate or empty your bladder within 8 hours.  Problems with breathing or chest pain.    You should call 911 if you develop problems with breathing or chest pain.  If you are unable to contact your doctor or surgical center, you should go to the nearest emergency room or urgent care center.  Physician's telephone #: **  Dr Orr [phone]*    If any questions arise, call your doctor.  If your doctor is not available, please feel free to call the Surgical Center at [phone].  The Center is open Monday through Friday from 7AM to 7PM.  You can also call the [employer] HOTLINE open 24 hours/day, 7 days/week and speak to a nurse at [phone], or toll free at [phone].    A registered nurse may call you a few days after your surgery to see how you are doing after your procedure.    MEDICATIONS: Resume taking daily medication.  Take prescribed pain medication with food.  If no medication is prescribed, you may take non-aspirin pain medication if needed.  PAIN MEDICATION CAN BE VERY CONSTIPATING.  Take a stool softener or laxative such as senokot, pericolace, or milk of magnesia if needed.    Prescription given for *percocet & zofran**.  Last pain medication given at *11:30am next dose after 3:30pm**.    If your physician has prescribed pain medication that includes Acetaminophen (Tylenol), do not take additional Acetaminophen (Tylenol) while taking the prescribed medication.    Depression / Suicide Risk    As you are discharged from this [facility] facility, it is important to learn how to keep safe from harming yourself.    Recognize the warning signs:  · Abrupt changes in personality, positive or negative- including increase in energy   · Giving  away possessions  · Change in eating patterns- significant weight changes-  positive or negative  · Change in sleeping patterns- unable to sleep or sleeping all the time   · Unwillingness or inability to communicate  · Depression  · Unusual sadness, discouragement and loneliness  · Talk of wanting to die  · Neglect of personal appearance   · Rebelliousness- reckless behavior  · Withdrawal from people/activities they love  · Confusion- inability to concentrate     If you or a loved one observes any of these behaviors or has concerns about self-harm, here's what you can do:  · Talk about it- your feelings and reasons for harming yourself  · Remove any means that you might use to hurt yourself (examples: pills, rope, extension cords, firearm)  · Get professional help from the community (Mental Health, Substance Abuse, psychological counseling)  · Do not be alone:Call your Safe Contact- someone whom you trust who will be there for you.  · Call your local CRISIS HOTLINE 449-3255 or 714-782-5127  · Call your local Children's Mobile Crisis Response Team [place] (776) 506-7213 or wwwCentrl  · Call the toll free National Suicide Prevention Hotlines   · National Suicide Prevention Lifeline 035-724-RMRQ (3912)  · National Hope Line Network 800-SUICIDE (854-7520)       Medication List      START taking these medications        Instructions    Morning Afternoon Evening Bedtime    ondansetron 4 MG Tabs tablet   Commonly known as:  ZOFRAN        Take 1 Tab by mouth every four hours as needed.   Dose:  4 mg                        oxycodone immediate-release 5 MG Tabs   Commonly known as:  ROXICODONE        Take 1 Tab by mouth every four hours as needed for Severe Pain.   Dose:  5 mg                          CONTINUE taking these medications        Instructions    Morning Afternoon Evening Bedtime    ALEVE PO        Take  by mouth 2 Times a Day. Unknown dose                        ALIGN PO        Take  by mouth  every day. Unsure of dose.                        ARIMIDEX PO        Take 1 mg by mouth every day.   Dose:  1 mg                        FOLIC ACID PO        Take  by mouth. Takes on Mon and Thurs                        KLONOPIN PO        Take 0.5 Tabs by mouth as needed.   Dose:  0.5 Tab                        LAMICTAL  MG Tb24   Generic drug:  LamoTRIgine        Take  by mouth every bedtime.                        NUEDEXTA 20-10 MG Caps   Generic drug:  Dextromethorphan-Quinidine        Take  by mouth 2 Times a Day.                        NUVIGIL PO        Take 75 mg by mouth every day.   Dose:  75 mg                        PREVACID 30 MG Cpdr   Generic drug:  lansoprazole        Take 30 mg by mouth every day.   Dose:  30 mg                        PROLIA 60 MG/ML Soln   Generic drug:  denosumab        Inject 60 mg as instructed Once. Every six months next dose due in March   Dose:  60 mg                        SEROQUEL XR 50 MG Tb24   Generic drug:  QUEtiapine Fumarate        Take 1 Tab by mouth every bedtime.   Dose:  1 Tab                        sertraline 100 MG Tabs   Commonly known as:  ZOLOFT        Take 100 mg by mouth every day.   Dose:  100 mg                             Where to Get Your Medications      Information about where to get these medications is not yet available     ! Ask your nurse or doctor about these medications    - ondansetron 4 MG Tabs tablet  - oxycodone immediate-release 5 MG Tabs            Medication Information     Above and/or attached are the medications your physician expects you to take upon discharge. Review all of your home medications and newly ordered medications with your doctor and/or pharmacist. Follow medication instructions as directed by your doctor and/or pharmacist. Please keep your medication list with you and share with your physician. Update the information when medications are discontinued, doses are changed, or new medications (including over-the-counter  products) are added; and carry medication information at all times in the event of emergency situations.        Resources     Quit Smoking / Tobacco Use:    I understand the use of any tobacco products increases my chance of suffering from future heart disease or stroke and could cause other illnesses which may shorten my life. Quitting the use of tobacco products is the single most important thing I can do to improve my health. For further information on smoking / tobacco cessation call a Toll Free Quit Line at 1-870.817.8042 (*[employer] [place]) or 1-780.581.5973 (American Lung Association) or you can access the web based program at www.lungusa.org.    [place] Tobacco Users Help Line:  (149) 744-9371       Toll Free: 1-182.756.5657  Quit Tobacco Program [facility] (736)949-9842    Crisis Hotline:    [place] Crisis Hotline:  2-859-OXFWZKO or 1-328.381.1443    [place] Crisis Hotline:    1-129.318.1029 or 328-542-6402    Discharge Survey:   Thank you for choosing [facility]. We hope we did everything we could to make your hospital stay a pleasant one. You may be receiving a survey and we would appreciate your time and participation in answering the questions. Your input is very valuable to us in our efforts to improve our service to our patients and their families.            Signatures     My signature on this form indicates that:    1. I acknowledge receipt and understanding of these Home Care Instruction.  2. My questions regarding this information have been answered to my satisfaction.  3. I have formulated a plan with my discharge nurse to obtain my prescribed medications for home.    __________________________________      __________________________________                   Patient Signature                                 Guardian/Responsible Adult Signature      __________________________________                 __________       ________                       Nurse Signature                                                Date                 Time

## 2017-04-27 NOTE — IP AVS SNAPSHOT
4/27/2017    Anders Chicas  [address] [place] [address]    Dear Anders:    [facility] wants to ensure your discharge home is safe and you or your loved ones have had all of your questions answered regarding your care after you leave the hospital.    Below is a list of resources and contact information should you have any questions regarding your hospital stay, follow-up instructions, or active medical symptoms.    Questions or Concerns Regarding… Contact   Medical Questions Related to Your Discharge  (7 days a week, 8am-5pm) Contact a Nurse Care Coordinator   [phone]   Medical Questions Not Related to Your Discharge  (24 hours a day / 7 days a week)  Contact the Nurse Health Line   [phone]    Medications or Discharge Instructions Refer to your discharge packet   or contact your [facility] Primary Care Provider   [phone]   Follow-up Appointment(s) Schedule your appointment via [employer]   or contact Scheduling [phone]   Billing Review your statement via [employer]  or contact Billing [phone]   Medical Records Review your records via [employer]   or contact Medical Records [phone]     You may receive a telephone call within two days of discharge. This call is to make certain you understand your discharge instructions and have the opportunity to have any questions answered. You can also easily access your medical information, test results and upcoming appointments via the [employer] free online health management tool. You can learn more and sign up at [facility] [employer] For assistance setting up your [employer] account, please call [phone].    Once again, we want to ensure your discharge home is safe and that you have a clear understanding of any next steps in your care. If you have any questions or concerns, please do not hesitate to contact us, we are here for you. Thank you for choosing [facility] for your healthcare needs.    Sincerely,    Your [facility] Healthcare Team

## 2017-04-27 NOTE — OR SURGEON
Immediate Post-Operative Note      PreOp Diagnosis: history of breast cancer, synmastia, asymmetry between reconstructed breasts    PostOp Diagnosis: same    Procedure(s):  NIPPLE RECONSTRUCTION - AREOLAR - Wound Class: Clean  FULL THICKNESS SKIN GRAFT - W/DERMAL GRAFTS - Wound Class: Clean  BREAST RECONSTRUCTION - REVISION W/FAT GRAFTING - Wound Class: Clean  SYNMASTIA REPAIR    Surgeon(s):  Darshan Orr M.D.    Anesthesiologist/Type of Anesthesia:  Anesthesiologist: Mani Spangler M.D./General    Surgical Staff:  Circulator: Danni Ulloa R.N.  Scrub Person: Maria E Moncada    Specimen: NONE    Estimated Blood Loss: MINIMAL    Findings: SEE OPERATIVE NOTE    Complications: NO APPARENT    #[id number]    4/27/2017 8:59 AM Darshan Orr

## 2017-04-27 NOTE — DISCHARGE INSTRUCTIONS
ACTIVITY: Rest and take it easy for the first 24 hours.  A responsible adult is recommended to remain with you during that time.  It is normal to feel sleepy.  We encourage you to not do anything that requires balance, judgment or coordination.    MILD FLU-LIKE SYMPTOMS ARE NORMAL. YOU MAY EXPERIENCE GENERALIZED MUSCLE ACHES, THROAT IRRITATION, HEADACHE AND/OR SOME NAUSEA.    FOR 24 HOURS DO NOT:  Drive, operate machinery or run household appliances.  Drink beer or alcoholic beverages.   Make important decisions or sign legal documents.    SPECIAL INSTRUCTIONS: **sleep with head elevated  Continue medications as previously ordered  *    DIET: To avoid nausea, slowly advance diet as tolerated, avoiding spicy or greasy foods for the first day.  Add more substantial food to your diet according to your physician's instructions.  Babies can be fed formula or breast milk as soon as they are hungry.  INCREASE FLUIDS AND FIBER TO AVOID CONSTIPATION.    SURGICAL DRESSING/BATHING: *keep dsgs dry until f/u appt**    FOLLOW-UP APPOINTMENT:  A follow-up appointment should be arranged with your doctor in *call for f/u**; call to schedule.    You should CALL YOUR PHYSICIAN if you develop:  Fever greater than 101 degrees F.  Pain not relieved by medication, or persistent nausea or vomiting.  Excessive bleeding (blood soaking through dressing) or unexpected drainage from the wound.  Extreme redness or swelling around the incision site, drainage of pus or foul smelling drainage.  Inability to urinate or empty your bladder within 8 hours.  Problems with breathing or chest pain.    You should call 911 if you develop problems with breathing or chest pain.  If you are unable to contact your doctor or surgical center, you should go to the nearest emergency room or urgent care center.  Physician's telephone #: **  Dr Orr [phone]*    If any questions arise, call your doctor.  If your doctor is not available, please feel free to call  the Surgical Center at [phone].  The Center is open Monday through Friday from 7AM to 7PM.  You can also call the HEALTH HOTLINE open 24 hours/day, 7 days/week and speak to a nurse at [phone], or toll free at [phone].    A registered nurse may call you a few days after your surgery to see how you are doing after your procedure.    MEDICATIONS: Resume taking daily medication.  Take prescribed pain medication with food.  If no medication is prescribed, you may take non-aspirin pain medication if needed.  PAIN MEDICATION CAN BE VERY CONSTIPATING.  Take a stool softener or laxative such as senokot, pericolace, or milk of magnesia if needed.    Prescription given for *percocet & zofran**.  Last pain medication given at *11:30am next dose after 3:30pm**.    If your physician has prescribed pain medication that includes Acetaminophen (Tylenol), do not take additional Acetaminophen (Tylenol) while taking the prescribed medication.    Depression / Suicide Risk    As you are discharged from this [facility] facility, it is important to learn how to keep safe from harming yourself.    Recognize the warning signs:  · Abrupt changes in personality, positive or negative- including increase in energy   · Giving away possessions  · Change in eating patterns- significant weight changes-  positive or negative  · Change in sleeping patterns- unable to sleep or sleeping all the time   · Unwillingness or inability to communicate  · Depression  · Unusual sadness, discouragement and loneliness  · Talk of wanting to die  · Neglect of personal appearance   · Rebelliousness- reckless behavior  · Withdrawal from people/activities they love  · Confusion- inability to concentrate     If you or a loved one observes any of these behaviors or has concerns about self-harm, here's what you can do:  · Talk about it- your feelings and reasons for harming yourself  · Remove any means that you might use to hurt yourself (examples:  pills, rope, extension cords, firearm)  · Get professional help from the community (Mental Health, Substance Abuse, psychological counseling)  · Do not be alone:Call your Safe Contact- someone whom you trust who will be there for you.  · Call your local CRISIS HOTLINE 245-9474 or 425-506-3765  · Call your local Children's Mobile Crisis Response Team [place] (532) 694-3597 or www.[employer]ision  · Call the toll free National Suicide Prevention Hotlines   · National Suicide Prevention Lifeline 641-484-PRUT (6649)  · National Hope Line Network 800-SUICIDE (931-4387)

## 2017-04-27 NOTE — OR NURSING
1155 Report received from Yaquelin AYALA. Pt c/o of dry mouth/lip will not drink however. Request and given cool compress for mouth.   1204 Pt medicated per MAR.   1220 Spoke to Dr Spangler patient still having pain, Dilaudid ordered and given.   1230 Pt resting with eyes closed. Report back to Yaquelin AYALA.

## 2017-04-27 NOTE — OR NURSING
"1034 received from the OR, report from Dr Spangler, s/p left breast reconstructions & papi nipple reconstruction, semi awake, grimacing, c/o pain, Dr Spangler dosed her, clara [facility]boleda [facility] d/i  1130 cont to have much pain, consulted with Dr Spangler & wanted meds go catch up with he, pt not wanting  at bedside yet, he was updated & RXs given to him to fill  1215 Dr Spangler at bedside, new orders received & meds given,  updated again, he states,\"she doesn't do well with pain, i would say she has a low tolerance\", he will get lunch  1300 pain 4/10, pt says \"I'm comfortable, I don't need any more meds at moment\",  at bedside  1415 remains sleepy, attempted r/a but sats dropping into the 80's, reported to Elise AYALA  "

## 2017-04-28 ENCOUNTER — PATIENT OUTREACH (OUTPATIENT)
Dept: OTHER | Facility: MEDICAL CENTER | Age: 67
End: 2017-04-28

## 2017-04-28 NOTE — PROGRESS NOTES
Phoned to review breast cancer treatment summary/ survivorship care plan which was mailed to patient 4/21/2017. Left message to call back.

## 2017-05-03 ENCOUNTER — PATIENT OUTREACH (OUTPATIENT)
Dept: OTHER | Facility: MEDICAL CENTER | Age: 67
End: 2017-05-03

## 2017-05-05 ENCOUNTER — PATIENT OUTREACH (OUTPATIENT)
Dept: OTHER | Facility: MEDICAL CENTER | Age: 67
End: 2017-05-05

## 2017-05-05 NOTE — PROGRESS NOTES
Phoned to review breast cancer treatment summary/ survivorship care plan which was mailed to pt 4/21/17.  No answer, left message to call back.

## 2017-10-17 ENCOUNTER — HOSPITAL ENCOUNTER (OUTPATIENT)
Dept: RADIOLOGY | Facility: MEDICAL CENTER | Age: 67
End: 2017-10-17
Attending: INTERNAL MEDICINE
Payer: COMMERCIAL

## 2017-10-17 DIAGNOSIS — M81.0 SENILE OSTEOPOROSIS: ICD-10-CM

## 2017-10-17 PROCEDURE — 77080 DXA BONE DENSITY AXIAL: CPT

## 2018-03-22 ENCOUNTER — HOSPITAL ENCOUNTER (EMERGENCY)
Facility: MEDICAL CENTER | Age: 68
End: 2018-03-22
Attending: EMERGENCY MEDICINE
Payer: COMMERCIAL

## 2018-03-22 ENCOUNTER — APPOINTMENT (OUTPATIENT)
Dept: RADIOLOGY | Facility: MEDICAL CENTER | Age: 68
End: 2018-03-22
Attending: EMERGENCY MEDICINE
Payer: COMMERCIAL

## 2018-03-22 VITALS
SYSTOLIC BLOOD PRESSURE: 129 MMHG | HEIGHT: 66 IN | BODY MASS INDEX: 22.82 KG/M2 | TEMPERATURE: 96.8 F | OXYGEN SATURATION: 94 % | WEIGHT: 142 LBS | RESPIRATION RATE: 18 BRPM | DIASTOLIC BLOOD PRESSURE: 60 MMHG | HEART RATE: 74 BPM

## 2018-03-22 DIAGNOSIS — R42 DIZZINESS: ICD-10-CM

## 2018-03-22 LAB
ALBUMIN SERPL BCP-MCNC: 4 G/DL (ref 3.2–4.9)
ALBUMIN/GLOB SERPL: 1.6 G/DL
ALP SERPL-CCNC: 76 U/L (ref 30–99)
ALT SERPL-CCNC: 17 U/L (ref 2–50)
ANION GAP SERPL CALC-SCNC: 10 MMOL/L (ref 0–11.9)
AST SERPL-CCNC: 23 U/L (ref 12–45)
BASOPHILS # BLD AUTO: 0.9 % (ref 0–1.8)
BASOPHILS # BLD: 0.05 K/UL (ref 0–0.12)
BILIRUB SERPL-MCNC: 0.3 MG/DL (ref 0.1–1.5)
BUN SERPL-MCNC: 14 MG/DL (ref 8–22)
CALCIUM SERPL-MCNC: 9.4 MG/DL (ref 8.5–10.5)
CHLORIDE SERPL-SCNC: 105 MMOL/L (ref 96–112)
CO2 SERPL-SCNC: 26 MMOL/L (ref 20–33)
CREAT SERPL-MCNC: 0.7 MG/DL (ref 0.5–1.4)
EOSINOPHIL # BLD AUTO: 0.03 K/UL (ref 0–0.51)
EOSINOPHIL NFR BLD: 0.5 % (ref 0–6.9)
ERYTHROCYTE [DISTWIDTH] IN BLOOD BY AUTOMATED COUNT: 44.4 FL (ref 35.9–50)
GLOBULIN SER CALC-MCNC: 2.5 G/DL (ref 1.9–3.5)
GLUCOSE SERPL-MCNC: 99 MG/DL (ref 65–99)
HCT VFR BLD AUTO: 41.4 % (ref 37–47)
HGB BLD-MCNC: 13.3 G/DL (ref 12–16)
IMM GRANULOCYTES # BLD AUTO: 0.02 K/UL (ref 0–0.11)
IMM GRANULOCYTES NFR BLD AUTO: 0.4 % (ref 0–0.9)
LYMPHOCYTES # BLD AUTO: 0.97 K/UL (ref 1–4.8)
LYMPHOCYTES NFR BLD: 17 % (ref 22–41)
MCH RBC QN AUTO: 30.8 PG (ref 27–33)
MCHC RBC AUTO-ENTMCNC: 32.1 G/DL (ref 33.6–35)
MCV RBC AUTO: 95.8 FL (ref 81.4–97.8)
MONOCYTES # BLD AUTO: 0.31 K/UL (ref 0–0.85)
MONOCYTES NFR BLD AUTO: 5.4 % (ref 0–13.4)
NEUTROPHILS # BLD AUTO: 4.31 K/UL (ref 2–7.15)
NEUTROPHILS NFR BLD: 75.8 % (ref 44–72)
NRBC # BLD AUTO: 0 K/UL
NRBC BLD-RTO: 0 /100 WBC
PLATELET # BLD AUTO: 145 K/UL (ref 164–446)
PMV BLD AUTO: 9.9 FL (ref 9–12.9)
POTASSIUM SERPL-SCNC: 4.4 MMOL/L (ref 3.6–5.5)
PROT SERPL-MCNC: 6.5 G/DL (ref 6–8.2)
RBC # BLD AUTO: 4.32 M/UL (ref 4.2–5.4)
SODIUM SERPL-SCNC: 141 MMOL/L (ref 135–145)
WBC # BLD AUTO: 5.7 K/UL (ref 4.8–10.8)

## 2018-03-22 PROCEDURE — 85025 COMPLETE CBC W/AUTO DIFF WBC: CPT

## 2018-03-22 PROCEDURE — A9579 GAD-BASE MR CONTRAST NOS,1ML: HCPCS | Performed by: EMERGENCY MEDICINE

## 2018-03-22 PROCEDURE — 96374 THER/PROPH/DIAG INJ IV PUSH: CPT

## 2018-03-22 PROCEDURE — 71045 X-RAY EXAM CHEST 1 VIEW: CPT

## 2018-03-22 PROCEDURE — 700117 HCHG RX CONTRAST REV CODE 255: Performed by: EMERGENCY MEDICINE

## 2018-03-22 PROCEDURE — 36415 COLL VENOUS BLD VENIPUNCTURE: CPT

## 2018-03-22 PROCEDURE — 80053 COMPREHEN METABOLIC PANEL: CPT

## 2018-03-22 PROCEDURE — 93005 ELECTROCARDIOGRAM TRACING: CPT | Performed by: EMERGENCY MEDICINE

## 2018-03-22 PROCEDURE — 70553 MRI BRAIN STEM W/O & W/DYE: CPT

## 2018-03-22 PROCEDURE — 700111 HCHG RX REV CODE 636 W/ 250 OVERRIDE (IP): Performed by: EMERGENCY MEDICINE

## 2018-03-22 PROCEDURE — 99284 EMERGENCY DEPT VISIT MOD MDM: CPT

## 2018-03-22 RX ORDER — MECLIZINE HCL 12.5 MG/1
12.5 TABLET ORAL 3 TIMES DAILY PRN
Qty: 20 TAB | Refills: 0 | Status: SHIPPED | OUTPATIENT
Start: 2018-03-22

## 2018-03-22 RX ORDER — LORAZEPAM 2 MG/ML
0.5 INJECTION INTRAMUSCULAR ONCE
Status: COMPLETED | OUTPATIENT
Start: 2018-03-22 | End: 2018-03-22

## 2018-03-22 RX ADMIN — GADODIAMIDE 15 ML: 287 INJECTION INTRAVENOUS at 16:22

## 2018-03-22 RX ADMIN — LORAZEPAM 0.5 MG: 2 INJECTION INTRAMUSCULAR; INTRAVENOUS at 15:50

## 2018-03-22 ASSESSMENT — PAIN SCALES - GENERAL
PAINLEVEL_OUTOF10: 0
PAINLEVEL_OUTOF10: 0

## 2018-03-22 NOTE — ED NOTES
Denies any resp symptoms or hx of oxygen problems but is 88% on room air so O2 applied at 3 L-sat with O2 is 92%.

## 2018-03-22 NOTE — ED TRIAGE NOTES
Chief Complaint   Patient presents with   • Dizziness     BIB EMS from home.  She had some dizziness yesterday for 3-4 hrs then it went away.  Came back this am about 0830.  She is unable to ambulate due to dizziness. Has not had this before.

## 2018-03-22 NOTE — ED PROVIDER NOTES
"ED Provider Note      ER PROVIDER NOTE        CHIEF COMPLAINT  Chief Complaint   Patient presents with   • Dizziness     BIB EMS from home.  She had some dizziness yesterday for 3-4 hrs then it went away.  Came back this am about 0830.  She is unable to ambulate due to dizziness. Has not had this before.       HPI  Anders Chicas is a 68 y.o. female who presents to the emergency department complaining of dizziness. She reports that yesterday while she was moving around the house doing some housework she had abrupt onset of difficulty with her balance, felt as if the world was moving around her. This resolved after 2-3 hours and she was able to continue with her daily activities. This morning at 8:30 she had abrupt return of her symptoms again while doing housework. Symptoms have persisted and have been constant since then. They do seem somewhat worse with sitting up but are not unilateral. She's had no headache, chest pain or difficulty breathing. No focal weakness numbness or tingling. No visual symptoms other than the feeling that her eyes are not \"tracking\" properly. She's had no recent cough or congestion.  No nausea vomiting or diarrhea. No prior similar episodes. No recent travel, no leg pain or swelling    REVIEW OF SYSTEMS  Pertinent positives include dizziness. Pertinent negatives include no headache. See HPI for details. All other systems reviewed and are negative.    PAST MEDICAL HISTORY   has a past medical history of Arthritis; Cancer (CMS-HCC) (2016); Dental disorder; History of anemia; OSTEOPOROSIS; Pain (09-25-12); Psychiatric disorder; and Sleep apnea.    SURGICAL HISTORY   has a past surgical history that includes other; colon overgrowth (1/22/2010); radius ulna orif (9/26/2012); inguinal hernia repair (Right); mastectomy (Bilateral, 8/18/2016); node biopsy sentinel (Right, 8/18/2016); axillary node dissection (8/18/2016); tissue expander place/remove (Bilateral, 8/18/2016); irrigation & " "debridement general (Bilateral, 8/30/2016); wound closure general (Bilateral, 8/30/2016); tissue expander place/remove (Bilateral, 1/19/2017); breast implant revision (Bilateral, 1/19/2017); breast reconstruction (Bilateral, 1/19/2017); nipple reconstruction (Bilateral, 4/27/2017); full thickness skin graft (Bilateral, 4/27/2017); and breast reconstruction (Bilateral, 4/27/2017).    FAMILY HISTORY  No family history on file.    SOCIAL HISTORY  Social History     Social History   • Marital status:      Spouse name: N/A   • Number of children: N/A   • Years of education: N/A     Social History Main Topics   • Smoking status: Never Smoker   • Smokeless tobacco: Never Used   • Alcohol use No   • Drug use: No      Comment: marijuana edibles   • Sexual activity: Not on file     Other Topics Concern   • Not on file     Social History Narrative   • No narrative on file      History   Drug Use No     Comment: marijuana edibles       CURRENT MEDICATIONS  Home Medications    **Home medications have not yet been reviewed for this encounter**         ALLERGIES  Allergies   Allergen Reactions   • Donnatal Hives       PHYSICAL EXAM  VITAL SIGNS: /60   Pulse 74   Temp 36 °C (96.8 °F)   Resp 18   Ht 1.676 m (5' 6\")   Wt 64.4 kg (142 lb)   SpO2 94%   BMI 22.92 kg/m²   Pulse ox interpretation: I interpret this pulse ox as normal.    Constitutional: Alert in no apparent distress.  HENT: No signs of trauma, Bilateral external ears normal, Nose normal.  Canals with minimal amount of wax other wise clear and TMs clear  Eyes: Pupils are equal and reactive, Conjunctiva normal, Non-icteric.   Neck: Normal range of motion, No tenderness, Supple, No stridor.   Lymphatic: No lymphadenopathy noted.   Cardiovascular: Regular rate and rhythm, no murmurs.   Thorax & Lungs: Normal breath sounds, No respiratory distress, No wheezing, No chest tenderness.   Abdomen: Bowel sounds normal, Soft, No tenderness, No masses, No pulsatile " masses. No peritoneal signs.  Skin: Warm, Dry, No erythema, No rash.   Back: No bony tenderness, No CVA tenderness.   Extremities: Intact distal pulses, No edema, No tenderness, No cyanosis, Negative Brenden's sign.  Musculoskeletal: Good range of motion in all major joints. No tenderness to palpation or major deformities noted.   Neurologic: Alert, HINTS WNL, cranial nerves intact, no nystagmus speech is appropriate or not slurred, upper extremities bilaterally exhibit no drift, no dysmetria, 5 out of 5 strength with bilateral bicep/tricep/, sensation intact to light touch throughout upper extremities. Lower extremities strength 5 out of 5 thigh extension/flexion/abduction/adduction, knee extension/flexion, dorsiflexion plantar flexion. No clonus.  2+ patella reflexes.  sensation intact to light touch.  No focal deficits noted. Gait not tested due to patient's significant dizziness  Psychiatric: Affect normal, Judgment normal, Mood normal.       DIAGNOSTIC STUDIES / PROCEDURES    EKG  Interpreted by me    Rhythm:  Sinus bradycardia   Rate: 56  Axis: normal  Intervals: normal  Ectopy: none  Conduction: normal  ST Segments: no acute change  T Waves: no acute change  Q Waves: none  Old EKG from 2012 shows no significant changes.    LABS  Labs Reviewed   CBC WITH DIFFERENTIAL - Abnormal; Notable for the following:        Result Value    MCHC 32.1 (*)     Platelet Count 145 (*)     Neutrophils-Polys 75.80 (*)     Lymphocytes 17.00 (*)     Lymphs (Absolute) 0.97 (*)     All other components within normal limits   COMP METABOLIC PANEL   ESTIMATED GFR       All labs reviewed by me.    RADIOLOGY  MR-BRAIN-WITH & W/O   Final Result      1.  Mild cerebral atrophy.   2.  Mild supratentorial white matter disease most consistent with microvascular ischemic change.   3.  No evidence of acute cerebral infarction, hemorrhage, or enhancing mass lesion. No evidence of brain metastasis.      DX-CHEST-PORTABLE (1 VIEW)   Final Result       Focal opacity at the right lung base measuring 1.3 cm may be infectious or inflammatory but malignancy is not excluded. Further evaluation can be performed with CT.      Right apical pleural thickening and parenchymal scarring.           The radiologist's interpretation of all radiological studies have been reviewed by me.    COURSE & MEDICAL DECISION MAKING  Nursing notes, VS, PMSFHx reviewed in chart.    1:25 PM Patient seen and examined at bedside. Patient will be treated with Ativan. Ordered for CBC, CMP, ECG, MRI to evaluate her symptoms.     4:49 PM patient reevaluated, states she is feeling improved. Will ambulate    5:25 PM patient ambulates well, without any return of symptoms, plan for discharge        Decision Making:  This is a 68 y.o. female presenting with dizziness. Does not appear to represent emergent condition at this time. Given her age and nature of her symptoms, central neurologic process such as CVA or mass was considered, MRI obtained and unremarkable, similarly her HINTS exam is normal.  She has no lightheadedness, palpitations or chest pain to suggest cardiac etiology,The patient has no valvular abnormality on my examination to suggest valvular cause or hypertrophic cardiomyopathy. The ekg does not show any evidence of arrythmia, prolonged intervals, early excitation, or other abnormality such as an accessory pathway, qt prolongation, or brugada syndrome. ACS or MI are unlikely causes given nature of sx, unremarkable ECG and given the patients improvement the likely of acs of mi is very low. Serious bacterial illness was considered but ruled out by history and physical exam.   There is no evidence of metabolic abnormalities to explain this episode on labs.    At this time patient does have close primary care follow-up, will trial meclizine, follow-up with primary care for recheck, discussed return precautions with the patient and her  which they understand well and agreed to,  also pulmonary nodule is noted and she will follow up with primary care for further evaluation of this..    The patient is referred to a primary physician for blood pressure management, diabetic screening, and for all other preventative health concerns.      DISPOSITION:  Patient will be discharged home in stable condition.    FOLLOW UP:  Quynh Nassar M.D.  [id number]  [address]  [facility] [address]  [phone]    In 3 days        OUTPATIENT MEDICATIONS:  Discharge Medication List as of 3/22/2018  5:26 PM      START taking these medications    Details   meclizine (ANTIVERT) 12.5 MG Tab Take 1 Tab by mouth 3 times a day as needed for Dizziness., Disp-20 Tab, R-0, Print Rx Paper               FINAL IMPRESSION  1. Dizziness          The note accurately reflects work and decisions made by me.  Saw Kay  3/22/2018  7:36 PM

## 2018-03-23 ENCOUNTER — PATIENT OUTREACH (OUTPATIENT)
Dept: HEALTH INFORMATION MANAGEMENT | Facility: OTHER | Age: 68
End: 2018-03-23

## 2018-03-23 NOTE — DISCHARGE INSTRUCTIONS
Your dizziness does not appear to represent a dangerous cause at this time. This may be an effect of some of your medications. Your MRI and blood tests are normal. Your x-ray does show a small lung nodule you need to follow-up with your primary care doctor to monitor this    Dizziness  Dizziness is a common problem. It is a feeling of unsteadiness or light-headedness. You may feel like you are about to faint. Dizziness can lead to injury if you stumble or fall. Anyone can become dizzy, but dizziness is more common in older adults. This condition can be caused by a number of things, including medicines, dehydration, or illness.  Follow these instructions at home:  Taking these steps may help with your condition:  Eating and drinking  · Drink enough fluid to keep your urine clear or pale yellow. This helps to keep you from becoming dehydrated. Try to drink more clear fluids, such as water.  · Do not drink alcohol.  · Limit your caffeine intake if directed by your health care provider.  · Limit your salt intake if directed by your health care provider.  Activity  · Avoid making quick movements.  ¨ Rise slowly from chairs and steady yourself until you feel okay.  ¨ In the morning, first sit up on the side of the bed. When you feel okay, stand slowly while you hold onto something until you know that your balance is fine.  · Move your legs often if you need to  one place for a long time. Tighten and relax your muscles in your legs while you are standing.  · Do not drive or operate heavy machinery if you feel dizzy.  · Avoid bending down if you feel dizzy. Place items in your home so that they are easy for you to reach without leaning over.  Lifestyle  · Do not use any tobacco products, including cigarettes, chewing tobacco, or electronic cigarettes. If you need help quitting, ask your health care provider.  · Try to reduce your stress level, such as with yoga or meditation. Talk with your health care provider if  you need help.  General instructions  · Watch your dizziness for any changes.  · Take medicines only as directed by your health care provider. Talk with your health care provider if you think that your dizziness is caused by a medicine that you are taking.  · Tell a friend or a family member that you are feeling dizzy. If he or she notices any changes in your behavior, have this person call your health care provider.  · Keep all follow-up visits as directed by your health care provider. This is important.  Contact a health care provider if:  · Your dizziness does not go away.  · Your dizziness or light-headedness gets worse.  · You feel nauseous.  · You have reduced hearing.  · You have new symptoms.  · You are unsteady on your feet or you feel like the room is spinning.  Get help right away if:  · You vomit or have diarrhea and are unable to eat or drink anything.  · You have problems talking, walking, swallowing, or using your arms, hands, or legs.  · You feel generally weak.  · You are not thinking clearly or you have trouble forming sentences. It may take a friend or family member to notice this.  · You have chest pain, abdominal pain, shortness of breath, or sweating.  · Your vision changes.  · You notice any bleeding.  · You have a headache.  · You have neck pain or a stiff neck.  · You have a fever.  This information is not intended to replace advice given to you by your health care provider. Make sure you discuss any questions you have with your health care provider.  Document Released: 06/13/2002 Document Revised: 05/25/2017 Document Reviewed: 12/14/2015  [employer] Interactive Patient Education © 2017 [employer]

## 2018-04-22 LAB — EKG IMPRESSION: NORMAL

## 2018-04-24 ENCOUNTER — HOSPITAL ENCOUNTER (OUTPATIENT)
Dept: RADIOLOGY | Facility: MEDICAL CENTER | Age: 68
End: 2018-04-24
Attending: INTERNAL MEDICINE
Payer: COMMERCIAL

## 2018-04-24 DIAGNOSIS — C50.111 MALIGNANT NEOPLASM OF CENTRAL PORTION OF RIGHT FEMALE BREAST, UNSPECIFIED ESTROGEN RECEPTOR STATUS (HCC): ICD-10-CM

## 2018-04-24 DIAGNOSIS — M81.0 SENILE OSTEOPOROSIS: ICD-10-CM

## 2018-04-24 PROCEDURE — 71260 CT THORAX DX C+: CPT

## 2018-04-24 PROCEDURE — 700117 HCHG RX CONTRAST REV CODE 255: Performed by: INTERNAL MEDICINE

## 2018-04-24 RX ADMIN — IOHEXOL 75 ML: 350 INJECTION, SOLUTION INTRAVENOUS at 09:15

## 2018-11-08 ENCOUNTER — HOSPITAL ENCOUNTER (OUTPATIENT)
Dept: LAB | Facility: MEDICAL CENTER | Age: 68
End: 2018-11-08
Attending: INTERNAL MEDICINE
Payer: COMMERCIAL

## 2018-11-08 LAB
ANION GAP SERPL CALC-SCNC: 12 MMOL/L (ref 0–11.9)
BUN SERPL-MCNC: 12 MG/DL (ref 8–22)
CALCIUM SERPL-MCNC: 8.8 MG/DL (ref 8.5–10.5)
CHLORIDE SERPL-SCNC: 111 MMOL/L (ref 96–112)
CO2 SERPL-SCNC: 23 MMOL/L (ref 20–33)
CREAT SERPL-MCNC: 0.57 MG/DL (ref 0.5–1.4)
GLUCOSE SERPL-MCNC: 101 MG/DL (ref 65–99)
MAGNESIUM SERPL-MCNC: 2.2 MG/DL (ref 1.5–2.5)
PHOSPHATE SERPL-MCNC: 3.2 MG/DL (ref 2.5–4.5)
POTASSIUM SERPL-SCNC: 4.1 MMOL/L (ref 3.6–5.5)
SODIUM SERPL-SCNC: 146 MMOL/L (ref 135–145)

## 2018-11-08 PROCEDURE — 83735 ASSAY OF MAGNESIUM: CPT

## 2018-11-08 PROCEDURE — 80048 BASIC METABOLIC PNL TOTAL CA: CPT

## 2018-11-08 PROCEDURE — 84100 ASSAY OF PHOSPHORUS: CPT

## 2018-12-14 ENCOUNTER — HOSPITAL ENCOUNTER (OUTPATIENT)
Dept: LAB | Facility: MEDICAL CENTER | Age: 68
End: 2018-12-14
Attending: INTERNAL MEDICINE
Payer: COMMERCIAL

## 2018-12-14 LAB
ANION GAP SERPL CALC-SCNC: 7 MMOL/L (ref 0–11.9)
BUN SERPL-MCNC: 19 MG/DL (ref 8–22)
CALCIUM SERPL-MCNC: 9.7 MG/DL (ref 8.5–10.5)
CHLORIDE SERPL-SCNC: 106 MMOL/L (ref 96–112)
CO2 SERPL-SCNC: 27 MMOL/L (ref 20–33)
CREAT SERPL-MCNC: 0.7 MG/DL (ref 0.5–1.4)
GLUCOSE SERPL-MCNC: 111 MG/DL (ref 65–99)
MAGNESIUM SERPL-MCNC: 2 MG/DL (ref 1.5–2.5)
PHOSPHATE SERPL-MCNC: 4 MG/DL (ref 2.5–4.5)
POTASSIUM SERPL-SCNC: 3.9 MMOL/L (ref 3.6–5.5)
SODIUM SERPL-SCNC: 140 MMOL/L (ref 135–145)

## 2018-12-14 PROCEDURE — 83735 ASSAY OF MAGNESIUM: CPT

## 2018-12-14 PROCEDURE — 84100 ASSAY OF PHOSPHORUS: CPT

## 2018-12-14 PROCEDURE — 36415 COLL VENOUS BLD VENIPUNCTURE: CPT

## 2018-12-14 PROCEDURE — 80048 BASIC METABOLIC PNL TOTAL CA: CPT

## 2019-11-25 ENCOUNTER — APPOINTMENT (OUTPATIENT)
Dept: RADIOLOGY | Facility: MEDICAL CENTER | Age: 69
End: 2019-11-25
Attending: INTERNAL MEDICINE
Payer: COMMERCIAL

## 2019-12-04 ENCOUNTER — HOSPITAL ENCOUNTER (OUTPATIENT)
Dept: RADIOLOGY | Facility: MEDICAL CENTER | Age: 69
End: 2019-12-04
Attending: INTERNAL MEDICINE
Payer: COMMERCIAL

## 2019-12-04 DIAGNOSIS — M85.89 DISAPPEARING BONE DISEASE: ICD-10-CM

## 2019-12-04 DIAGNOSIS — M81.0 SENILE OSTEOPOROSIS: ICD-10-CM

## 2019-12-04 DIAGNOSIS — C50.111 MALIGNANT NEOPLASM OF CENTRAL PORTION OF RIGHT FEMALE BREAST, UNSPECIFIED ESTROGEN RECEPTOR STATUS (HCC): ICD-10-CM

## 2019-12-04 PROCEDURE — 77080 DXA BONE DENSITY AXIAL: CPT

## 2020-12-04 ENCOUNTER — HOSPITAL ENCOUNTER (OUTPATIENT)
Dept: LAB | Facility: MEDICAL CENTER | Age: 70
End: 2020-12-04
Attending: INTERNAL MEDICINE
Payer: COMMERCIAL

## 2020-12-04 LAB
ALBUMIN SERPL BCP-MCNC: 4.2 G/DL (ref 3.2–4.9)
ALBUMIN/GLOB SERPL: 1.5 G/DL
ALP SERPL-CCNC: 109 U/L (ref 30–99)
ALT SERPL-CCNC: 27 U/L (ref 2–50)
ANION GAP SERPL CALC-SCNC: 9 MMOL/L (ref 7–16)
AST SERPL-CCNC: 16 U/L (ref 12–45)
BASOPHILS # BLD AUTO: 1 % (ref 0–1.8)
BASOPHILS # BLD: 0.06 K/UL (ref 0–0.12)
BILIRUB SERPL-MCNC: 0.3 MG/DL (ref 0.1–1.5)
BUN SERPL-MCNC: 16 MG/DL (ref 8–22)
CALCIUM SERPL-MCNC: 9.5 MG/DL (ref 8.5–10.5)
CHLORIDE SERPL-SCNC: 103 MMOL/L (ref 96–112)
CO2 SERPL-SCNC: 26 MMOL/L (ref 20–33)
CREAT SERPL-MCNC: 0.77 MG/DL (ref 0.5–1.4)
EOSINOPHIL # BLD AUTO: 0.06 K/UL (ref 0–0.51)
EOSINOPHIL NFR BLD: 1 % (ref 0–6.9)
ERYTHROCYTE [DISTWIDTH] IN BLOOD BY AUTOMATED COUNT: 43.8 FL (ref 35.9–50)
GLOBULIN SER CALC-MCNC: 2.8 G/DL (ref 1.9–3.5)
GLUCOSE SERPL-MCNC: 87 MG/DL (ref 65–99)
HCT VFR BLD AUTO: 45.3 % (ref 37–47)
HGB BLD-MCNC: 14.1 G/DL (ref 12–16)
IMM GRANULOCYTES # BLD AUTO: 0.02 K/UL (ref 0–0.11)
IMM GRANULOCYTES NFR BLD AUTO: 0.3 % (ref 0–0.9)
LYMPHOCYTES # BLD AUTO: 1.48 K/UL (ref 1–4.8)
LYMPHOCYTES NFR BLD: 23.9 % (ref 22–41)
MAGNESIUM SERPL-MCNC: 2.1 MG/DL (ref 1.5–2.5)
MCH RBC QN AUTO: 29.9 PG (ref 27–33)
MCHC RBC AUTO-ENTMCNC: 31.1 G/DL (ref 33.6–35)
MCV RBC AUTO: 96 FL (ref 81.4–97.8)
MONOCYTES # BLD AUTO: 0.38 K/UL (ref 0–0.85)
MONOCYTES NFR BLD AUTO: 6.1 % (ref 0–13.4)
NEUTROPHILS # BLD AUTO: 4.18 K/UL (ref 2–7.15)
NEUTROPHILS NFR BLD: 67.7 % (ref 44–72)
NRBC # BLD AUTO: 0 K/UL
NRBC BLD-RTO: 0 /100 WBC
PHOSPHATE SERPL-MCNC: 3.7 MG/DL (ref 2.5–4.5)
PLATELET # BLD AUTO: 149 K/UL (ref 164–446)
PMV BLD AUTO: 10.9 FL (ref 9–12.9)
POTASSIUM SERPL-SCNC: 4.1 MMOL/L (ref 3.6–5.5)
PROT SERPL-MCNC: 7 G/DL (ref 6–8.2)
RBC # BLD AUTO: 4.72 M/UL (ref 4.2–5.4)
SODIUM SERPL-SCNC: 138 MMOL/L (ref 135–145)
WBC # BLD AUTO: 6.2 K/UL (ref 4.8–10.8)

## 2020-12-04 PROCEDURE — 84100 ASSAY OF PHOSPHORUS: CPT

## 2020-12-04 PROCEDURE — 36415 COLL VENOUS BLD VENIPUNCTURE: CPT

## 2020-12-04 PROCEDURE — 85025 COMPLETE CBC W/AUTO DIFF WBC: CPT

## 2020-12-04 PROCEDURE — 80053 COMPREHEN METABOLIC PANEL: CPT

## 2020-12-04 PROCEDURE — 83735 ASSAY OF MAGNESIUM: CPT

## 2021-01-15 DIAGNOSIS — Z23 NEED FOR VACCINATION: ICD-10-CM

## (undated) DEVICE — CANISTER SUCTION RIGID RED 1500CC (40EA/CA)

## (undated) DEVICE — GLOVE BIOGEL INDICATOR SZ 8.5 SURGICAL PF LTX - (50/BX 4BX/CA)

## (undated) DEVICE — DRAPE LARGE 3 QUARTER - (20/CA)

## (undated) DEVICE — SENSOR SPO2 NEO LNCS ADHESIVE (20/BX) SEE USER NOTES

## (undated) DEVICE — PACK MINOR BASIN - (2EA/CA)

## (undated) DEVICE — GOWN SURGEONS LARGE - (32/CA)

## (undated) DEVICE — SPANDAGE CHEST SZ10 25YDS - STRETCH (21 EA/CA 1RL/CA)

## (undated) DEVICE — GLOVE SZ 6 BIOGEL PI MICRO - PF LF (50PR/BX 4BX/CA)

## (undated) DEVICE — STAPLER SKIN DISP - (6/BX 10BX/CA) VISISTAT

## (undated) DEVICE — GLOVE BIOGEL PI INDICATOR SZ 8.0 SURGICAL PF LF -(50/BX 4BX/CA)

## (undated) DEVICE — ELECTRODE DUAL RETURN W/ CORD - (50/PK)

## (undated) DEVICE — SYRINGE 10 ML CONTROL LL (25EA/BX 4BX/CA)

## (undated) DEVICE — GOWN WARMING STANDARD FLEX - (30/CA)

## (undated) DEVICE — TOWELS CLOTH SURGICAL - (4/PK 20PK/CA)

## (undated) DEVICE — SPONGE GAUZESTER 4 X 4 4PLY - (128PK/CA)

## (undated) DEVICE — GLOVE BIOGEL SZ 6.5 SURGICAL PF LTX (50PR/BX 4BX/CA)

## (undated) DEVICE — GLOVE BIOGEL PI INDICATOR SZ 6.5 SURGICAL PF LF - (50/BX 4BX/CA)

## (undated) DEVICE — ARMREST CRADLE FOAM - (2PR/PK 12PR/CA)

## (undated) DEVICE — DRESSING TRANSPARENT FILM TEGADERM 2.375 X 2.75"  (100EA/BX)"

## (undated) DEVICE — MASK, LARYNGEAL AIRWAY #4

## (undated) DEVICE — DRESSING ABDOMINAL PAD STERILE 8 X 10" (360EA/CA)"

## (undated) DEVICE — SUTURE 2-0 VICRYL PLUS SH - 8 X 18 INCH (12/BX)

## (undated) DEVICE — SPONGE DRAIN 4 X 4IN 6-PLY - (2/PK25PK/BX12BX/CS)

## (undated) DEVICE — KIT ANESTHESIA W/CIRCUIT & 3/LT BAG W/FILTER (20EA/CA)

## (undated) DEVICE — DRAPE IOBAN II INCISE 23X17 - (10EA/BX 4BX/CA)

## (undated) DEVICE — MASK ANESTHESIA ADULT  - (100/CA)

## (undated) DEVICE — SUTURE 2-0 ETHILON FS - (36/BX) 18 INCH

## (undated) DEVICE — SODIUM CHL IRRIGATION 0.9% 1000ML (12EA/CA)

## (undated) DEVICE — BAG DECANTER (50EA/CS)

## (undated) DEVICE — SUTURE 2-0 PDS II CT-2 - (36/BX)

## (undated) DEVICE — CHLORAPREP 26 ML APPLICATOR - ORANGE TINT(25/CA)

## (undated) DEVICE — DRESSING PETROLEUM GAUZE 5 X 9" (50EA/BX 4BX/CA)"

## (undated) DEVICE — GLOVE SZ 6.5 BIOGEL PI MICRO - PF LF (50PR/BX)

## (undated) DEVICE — MANIFOLD NEPTUNE 1 PORT (20/PK)

## (undated) DEVICE — CANISTER SUCTION 3000ML MECHANICAL FILTER AUTO SHUTOFF MEDI-VAC NONSTERILE LF DISP  (40EA/CA)

## (undated) DEVICE — GLOVE BIOGEL SZ 7.5 SURGICAL PF LTX - (50PR/BX 4BX/CA)

## (undated) DEVICE — SET EXTENSION WITH 2 PORTS (48EA/CA) ***PART #2C8610 IS A SUBSTITUTE*****

## (undated) DEVICE — CORDS BIPOLAR COAGULATION - 12FT STERILE DISP. (10EA/BX)

## (undated) DEVICE — SUCTION INSTRUMENT YANKAUER BULBOUS TIP W/O VENT (50EA/CA)

## (undated) DEVICE — SPONGE GAUZE NON-STERILE 4X4 - (2000/CA 10PK/CA)

## (undated) DEVICE — SUTURE 3-0 MONOCRYL PLUS PS-2 - (12/BX)

## (undated) DEVICE — TUBE CONNECTING SUCTION - CLEAR PLASTIC STERILE 72 IN (50EA/CA)

## (undated) DEVICE — DRAPE SURGICAL U 77X120 - (10/CA)

## (undated) DEVICE — SUTURE GENERAL

## (undated) DEVICE — BLADE SURGICAL #15 - (50/BX 3BX/CA)

## (undated) DEVICE — SET MULTI-ADD FLUID TRANSFER 42 INCH - (50/CA)THIS IS A CUSTOM MADE ITEM 4 TO 6 WEEK LEAD TIME

## (undated) DEVICE — MANIFOLD NEPTUNE 1 PORT

## (undated) DEVICE — ELECTRODE 850 FOAM ADHESIVE - HYDROGEL RADIOTRNSPRNT (50/PK)

## (undated) DEVICE — BLADE SURGICAL #10 - (50/BX)

## (undated) DEVICE — LEAD SET 6 DISP. EKG NIHON KOHDEN

## (undated) DEVICE — SUTURE 5-0 PLAIN GUT PC-1 - (12/BX)

## (undated) DEVICE — CATHETER IV 20 GA X 1-1/4 ---SURG.& SDS ONLY--- (50EA/BX)

## (undated) DEVICE — BOVIE BLADE COATED &INSULATED - 25/PK

## (undated) DEVICE — CLOSURE SKIN STRIP 1/2 X 4 IN - (STERI STRIP) (50/BX 4BX/CA)

## (undated) DEVICE — BALL COTTON STERILE 5/PK - (5/PK 25PK/CA)

## (undated) DEVICE — SOD. CHL. INJ. 0.9% 1000 ML - (14EA/CA 60CA/PF)

## (undated) DEVICE — SYRINGE DISP. 60 CC LL - (30/BX, 12BX/CA)**WHEN THESE ARE GONE ORDER #500206**

## (undated) DEVICE — SET LEADWIRE 5 LEAD BEDSIDE DISPOSABLE ECG (1SET OF 5/EA)

## (undated) DEVICE — LACTATED RINGERS INJ 1000 ML - (14EA/CA 60CA/PF)

## (undated) DEVICE — GOWN SURGEONS X-LARGE - DISP. (30/CA)

## (undated) DEVICE — SPLINT ORTH 15FTX3IN PD STKNT - (ORTHO-GLASS) OG-3L2 & OG-3L1 IS THE SAME PRODUCT.

## (undated) DEVICE — PROTECTOR ULNA NERVE - (36PR/CA)

## (undated) DEVICE — SUTURE 4-0 MONOCRYL PLUS PS-2 - 27 INCH (36/BX)

## (undated) DEVICE — TUBING CLEARLINK DUO-VENT - C-FLO (48EA/CA)

## (undated) DEVICE — SUTURE 4-0 SILK SH C/R 8 X 18 (12EA/BX)"

## (undated) DEVICE — KIT  I.V. START (100EA/CA)

## (undated) DEVICE — PAD PROVIDONE IODINE 2-1/8X1 (100EA/BX)

## (undated) DEVICE — HEAD HOLDER JUNIOR/ADULT

## (undated) DEVICE — GLOVE BIOGEL SZ 7 SURGICAL PF LTX - (50PR/BX 4BX/CA)

## (undated) DEVICE — SUTURE 4-0 ETHILON FS-2 18 (36PK/BX)"

## (undated) DEVICE — SYRINGE DISP. 50CC LS - (40/BX)

## (undated) DEVICE — SPONGE XRAY 8X4 STERL. 12PL - (10EA/TY 80TY/CA)

## (undated) DEVICE — GLOVE BIOGEL SZ 8 SURGICAL PF LTX - (50PR/BX 4BX/CA)

## (undated) DEVICE — NEEDLE SAFETY 18 GA X 1 1/2 IN (100EA/BX)

## (undated) DEVICE — PACK SINGLE BASIN - (6/CA)

## (undated) DEVICE — SUTURE 4-0 VICRYL PLUS FS-2 - 27 INCH (36/BX)

## (undated) DEVICE — SYRINGE SAFETY 10 ML 18 GA X 1 1/2 BLUNT LL (100/BX 4BX/CA)

## (undated) DEVICE — GAUZE FLUFF STERILE 2-PLY 36 X 36 (100EA/CA)

## (undated) DEVICE — MASK AIRWAY SIZE 3 UNIQUE SILICON (10/BX)

## (undated) DEVICE — SYRINGE 20 ML LS (48/BX 4BX/CA)

## (undated) DEVICE — GOWN SURGICAL X-LARGE ULTRA - FILM-REINFORCED (20/CA)

## (undated) DEVICE — WATER IRRIGATION STERILE 1000ML (12EA/CA)

## (undated) DEVICE — NEPTUNE 4 PORT MANIFOLD - (20/PK)

## (undated) DEVICE — SUTURE 3-0 30 CM X 30 CM STRATAFIX SPRIAL PS-1 NEEDLE (12/BX)